# Patient Record
Sex: FEMALE | Race: WHITE | NOT HISPANIC OR LATINO
[De-identification: names, ages, dates, MRNs, and addresses within clinical notes are randomized per-mention and may not be internally consistent; named-entity substitution may affect disease eponyms.]

---

## 2018-08-02 ENCOUNTER — APPOINTMENT (OUTPATIENT)
Dept: OBGYN | Facility: CLINIC | Age: 43
End: 2018-08-02

## 2018-08-14 ENCOUNTER — OUTPATIENT (OUTPATIENT)
Dept: OUTPATIENT SERVICES | Facility: HOSPITAL | Age: 43
LOS: 1 days | End: 2018-08-14
Payer: COMMERCIAL

## 2018-08-14 ENCOUNTER — APPOINTMENT (OUTPATIENT)
Dept: MAMMOGRAPHY | Facility: HOSPITAL | Age: 43
End: 2018-08-14
Payer: COMMERCIAL

## 2018-08-14 DIAGNOSIS — Z00.8 ENCOUNTER FOR OTHER GENERAL EXAMINATION: ICD-10-CM

## 2018-08-14 PROCEDURE — 77067 SCR MAMMO BI INCL CAD: CPT | Mod: 26

## 2018-08-14 PROCEDURE — 77063 BREAST TOMOSYNTHESIS BI: CPT

## 2018-08-14 PROCEDURE — 77067 SCR MAMMO BI INCL CAD: CPT

## 2018-08-14 PROCEDURE — 77063 BREAST TOMOSYNTHESIS BI: CPT | Mod: 26

## 2019-02-15 ENCOUNTER — TRANSCRIPTION ENCOUNTER (OUTPATIENT)
Age: 44
End: 2019-02-15

## 2019-04-23 ENCOUNTER — APPOINTMENT (OUTPATIENT)
Dept: OBGYN | Facility: CLINIC | Age: 44
End: 2019-04-23
Payer: COMMERCIAL

## 2019-04-23 VITALS
WEIGHT: 110 LBS | DIASTOLIC BLOOD PRESSURE: 70 MMHG | HEIGHT: 66 IN | HEART RATE: 85 BPM | BODY MASS INDEX: 17.68 KG/M2 | SYSTOLIC BLOOD PRESSURE: 100 MMHG | OXYGEN SATURATION: 98 %

## 2019-04-23 DIAGNOSIS — Z87.891 PERSONAL HISTORY OF NICOTINE DEPENDENCE: ICD-10-CM

## 2019-04-23 DIAGNOSIS — R92.2 INCONCLUSIVE MAMMOGRAM: ICD-10-CM

## 2019-04-23 DIAGNOSIS — Z83.3 FAMILY HISTORY OF DIABETES MELLITUS: ICD-10-CM

## 2019-04-23 DIAGNOSIS — Z80.51 FAMILY HISTORY OF MALIGNANT NEOPLASM OF KIDNEY: ICD-10-CM

## 2019-04-23 DIAGNOSIS — Z12.31 ENCOUNTER FOR SCREENING MAMMOGRAM FOR MALIGNANT NEOPLASM OF BREAST: ICD-10-CM

## 2019-04-23 DIAGNOSIS — Z12.11 ENCOUNTER FOR SCREENING FOR MALIGNANT NEOPLASM OF COLON: ICD-10-CM

## 2019-04-23 PROCEDURE — 99386 PREV VISIT NEW AGE 40-64: CPT

## 2019-04-23 NOTE — PHYSICAL EXAM
[Awake] : awake [Alert] : alert [Acute Distress] : no acute distress [Mass] : no breast mass [Nipple Discharge] : no nipple discharge [Axillary LAD] : no axillary lymphadenopathy [Tender] : non tender [Soft] : soft [Oriented x3] : oriented to person, place, and time [No Bleeding] : there was no active vaginal bleeding [Normal] : uterus [Uterine Adnexae] : were not tender and not enlarged

## 2019-04-23 NOTE — HISTORY OF PRESENT ILLNESS
[Good] : being in good health [Reproductive Age] : is of reproductive age [Contraception] : uses contraception [Sexually Active] : is sexually active [Vasectomy (partner)] : has a partner with a vasectomy [HPV Vaccine NA Due to Age] : HPV vaccine not available to patient due to age

## 2019-04-24 LAB — HPV HIGH+LOW RISK DNA PNL CVX: NOT DETECTED

## 2019-04-26 LAB — CYTOLOGY CVX/VAG DOC THIN PREP: NORMAL

## 2019-07-10 ENCOUNTER — APPOINTMENT (OUTPATIENT)
Dept: GASTROENTEROLOGY | Facility: CLINIC | Age: 44
End: 2019-07-10

## 2019-08-30 ENCOUNTER — APPOINTMENT (OUTPATIENT)
Dept: OBGYN | Facility: CLINIC | Age: 44
End: 2019-08-30
Payer: COMMERCIAL

## 2019-08-30 VITALS
DIASTOLIC BLOOD PRESSURE: 70 MMHG | HEIGHT: 66 IN | SYSTOLIC BLOOD PRESSURE: 110 MMHG | WEIGHT: 103 LBS | BODY MASS INDEX: 16.55 KG/M2

## 2019-08-30 DIAGNOSIS — N94.6 DYSMENORRHEA, UNSPECIFIED: ICD-10-CM

## 2019-08-30 LAB
HCG UR QL: NEGATIVE
QUALITY CONTROL: YES

## 2019-08-30 PROCEDURE — 99214 OFFICE O/P EST MOD 30 MIN: CPT

## 2019-08-30 NOTE — COUNSELING
[Breast Self Exam] : breast self exam [Nutrition] : nutrition [Exercise] : exercise [Vitamins/Supplements] : vitamins/supplements [STD (testing, results, tx)] : STD (testing, results, tx) [Contraception] : contraception [Emergency Contraception] : emergency contraception [Safe Sexual Practices] : safe sexual practices [Medication Management] : medication management [Pain Management] : pain management

## 2019-08-30 NOTE — PHYSICAL EXAM
[Normal] : uterus [Uterine Adnexae] : were not tender and not enlarged [Soft, Nontender] : the abdomen was soft and nontender [No Mass] : no masses were palpated [None] : no CVA tenderness [Scant] : there was scant vaginal bleeding

## 2019-08-30 NOTE — CHIEF COMPLAINT
[Follow Up] : follow up GYN visit [FreeTextEntry1] : menstrual disorder--severe dysmen past 2 mos and ovulation pain with diarrhea and nauseated.Fatigued as well.Info HTA/Mirena provided. Menses has been heavy

## 2019-09-19 ENCOUNTER — APPOINTMENT (OUTPATIENT)
Dept: ULTRASOUND IMAGING | Facility: HOSPITAL | Age: 44
End: 2019-09-19
Payer: COMMERCIAL

## 2019-09-19 ENCOUNTER — OUTPATIENT (OUTPATIENT)
Dept: OUTPATIENT SERVICES | Facility: HOSPITAL | Age: 44
LOS: 1 days | End: 2019-09-19
Payer: COMMERCIAL

## 2019-09-19 ENCOUNTER — APPOINTMENT (OUTPATIENT)
Dept: MAMMOGRAPHY | Facility: HOSPITAL | Age: 44
End: 2019-09-19
Payer: COMMERCIAL

## 2019-09-19 DIAGNOSIS — Z00.8 ENCOUNTER FOR OTHER GENERAL EXAMINATION: ICD-10-CM

## 2019-09-19 PROCEDURE — 76641 ULTRASOUND BREAST COMPLETE: CPT | Mod: 26,50

## 2019-09-19 PROCEDURE — 76830 TRANSVAGINAL US NON-OB: CPT | Mod: 26

## 2019-09-19 PROCEDURE — 77063 BREAST TOMOSYNTHESIS BI: CPT

## 2019-09-19 PROCEDURE — 77063 BREAST TOMOSYNTHESIS BI: CPT | Mod: 26,59

## 2019-09-19 PROCEDURE — 77065 DX MAMMO INCL CAD UNI: CPT

## 2019-09-19 PROCEDURE — 76856 US EXAM PELVIC COMPLETE: CPT | Mod: 26

## 2019-09-19 PROCEDURE — 77067 SCR MAMMO BI INCL CAD: CPT | Mod: 26,59

## 2019-09-19 PROCEDURE — 76856 US EXAM PELVIC COMPLETE: CPT

## 2019-09-19 PROCEDURE — 77065 DX MAMMO INCL CAD UNI: CPT | Mod: 26,GG,RT

## 2019-09-19 PROCEDURE — 77067 SCR MAMMO BI INCL CAD: CPT

## 2019-09-19 PROCEDURE — 76830 TRANSVAGINAL US NON-OB: CPT

## 2019-09-19 PROCEDURE — 76641 ULTRASOUND BREAST COMPLETE: CPT

## 2019-09-25 ENCOUNTER — APPOINTMENT (OUTPATIENT)
Dept: OBGYN | Facility: CLINIC | Age: 44
End: 2019-09-25
Payer: COMMERCIAL

## 2019-09-25 VITALS
WEIGHT: 106 LBS | DIASTOLIC BLOOD PRESSURE: 68 MMHG | OXYGEN SATURATION: 98 % | HEART RATE: 82 BPM | BODY MASS INDEX: 17.04 KG/M2 | SYSTOLIC BLOOD PRESSURE: 104 MMHG | HEIGHT: 66 IN

## 2019-09-25 DIAGNOSIS — R93.89 ABNORMAL FINDINGS ON DIAGNOSTIC IMAGING OF OTHER SPECIFIED BODY STRUCTURES: ICD-10-CM

## 2019-09-25 DIAGNOSIS — R92.8 OTHER ABNORMAL AND INCONCLUSIVE FINDINGS ON DIAGNOSTIC IMAGING OF BREAST: ICD-10-CM

## 2019-09-25 DIAGNOSIS — N92.6 IRREGULAR MENSTRUATION, UNSPECIFIED: ICD-10-CM

## 2019-09-25 LAB
HCG UR QL: NEGATIVE
QUALITY CONTROL: YES

## 2019-09-25 PROCEDURE — 58100 BIOPSY OF UTERUS LINING: CPT

## 2019-09-25 PROCEDURE — 99214 OFFICE O/P EST MOD 30 MIN: CPT | Mod: 25

## 2019-09-25 NOTE — CHIEF COMPLAINT
[Follow Up] : follow up GYN visit [FreeTextEntry1] : pt with menstrual disorder. Rev TVS EL 16 mm.\par Husb vasectomy. Menses heavy --disc options as well with pt TXA,HTA,Mirena\par LMP 9/24  8/27\par Rev breast imaging with pt--rev rec .Pt interested in breast specialist consult

## 2019-09-25 NOTE — PROCEDURE
[Endometrial Biopsy] : Endometrial biopsy [Abnormal US] : abnormal ultrasound findings [Risks] : risks [Patient] : patient [Bleeding] : bleeding [Uterine Perforation] : uterine perforation [Pain] : pain [CONSENT OBTAINED] : written consent was obtained prior to the procedure. [Pipelle] : a Pipelle endometrial suction curette [Moderate] : a moderate [Sent to Histology] : the specimen was place in buffered formalin and sent for pathlogy [Tolerated Well] : the patient tolerated the procedure well [No Complications] : there were no complications

## 2019-09-25 NOTE — COUNSELING
[Breast Self Exam] : breast self exam [Exercise] : exercise [Nutrition] : nutrition [Vitamins/Supplements] : vitamins/supplements [Medication Management] : medication management [Pre/Post Op Instructions] : pre/post op instructions

## 2019-09-30 LAB — CORE LAB BIOPSY: NORMAL

## 2019-10-01 ENCOUNTER — OTHER (OUTPATIENT)
Age: 44
End: 2019-10-01

## 2020-03-20 ENCOUNTER — RESULT REVIEW (OUTPATIENT)
Age: 45
End: 2020-03-20

## 2020-03-20 ENCOUNTER — APPOINTMENT (OUTPATIENT)
Dept: ULTRASOUND IMAGING | Facility: HOSPITAL | Age: 45
End: 2020-03-20
Payer: COMMERCIAL

## 2020-03-20 ENCOUNTER — OUTPATIENT (OUTPATIENT)
Dept: OUTPATIENT SERVICES | Facility: HOSPITAL | Age: 45
LOS: 1 days | End: 2020-03-20
Payer: COMMERCIAL

## 2020-03-20 DIAGNOSIS — Z12.31 ENCOUNTER FOR SCREENING MAMMOGRAM FOR MALIGNANT NEOPLASM OF BREAST: ICD-10-CM

## 2020-03-20 PROCEDURE — 76641 ULTRASOUND BREAST COMPLETE: CPT

## 2020-03-20 PROCEDURE — 76641 ULTRASOUND BREAST COMPLETE: CPT | Mod: 26,50

## 2020-10-03 ENCOUNTER — TRANSCRIPTION ENCOUNTER (OUTPATIENT)
Age: 45
End: 2020-10-03

## 2020-11-24 ENCOUNTER — APPOINTMENT (OUTPATIENT)
Dept: ULTRASOUND IMAGING | Facility: HOSPITAL | Age: 45
End: 2020-11-24

## 2020-11-24 ENCOUNTER — RESULT REVIEW (OUTPATIENT)
Age: 45
End: 2020-11-24

## 2020-11-24 ENCOUNTER — APPOINTMENT (OUTPATIENT)
Dept: MAMMOGRAPHY | Facility: HOSPITAL | Age: 45
End: 2020-11-24
Payer: COMMERCIAL

## 2020-11-24 ENCOUNTER — OUTPATIENT (OUTPATIENT)
Dept: OUTPATIENT SERVICES | Facility: HOSPITAL | Age: 45
LOS: 1 days | End: 2020-11-24
Payer: COMMERCIAL

## 2020-11-24 DIAGNOSIS — Z12.39 ENCOUNTER FOR OTHER SCREENING FOR MALIGNANT NEOPLASM OF BREAST: ICD-10-CM

## 2020-11-24 PROCEDURE — 76641 ULTRASOUND BREAST COMPLETE: CPT | Mod: 26,50

## 2020-11-24 PROCEDURE — 77063 BREAST TOMOSYNTHESIS BI: CPT

## 2020-11-24 PROCEDURE — 76641 ULTRASOUND BREAST COMPLETE: CPT

## 2020-11-24 PROCEDURE — 77063 BREAST TOMOSYNTHESIS BI: CPT | Mod: 26

## 2020-11-24 PROCEDURE — 77067 SCR MAMMO BI INCL CAD: CPT

## 2020-11-24 PROCEDURE — 77067 SCR MAMMO BI INCL CAD: CPT | Mod: 26

## 2021-02-01 ENCOUNTER — APPOINTMENT (OUTPATIENT)
Dept: OBGYN | Facility: CLINIC | Age: 46
End: 2021-02-01
Payer: COMMERCIAL

## 2021-02-05 ENCOUNTER — APPOINTMENT (OUTPATIENT)
Dept: OBGYN | Facility: CLINIC | Age: 46
End: 2021-02-05
Payer: COMMERCIAL

## 2021-02-05 VITALS
OXYGEN SATURATION: 98 % | BODY MASS INDEX: 17.68 KG/M2 | DIASTOLIC BLOOD PRESSURE: 70 MMHG | WEIGHT: 110 LBS | TEMPERATURE: 97.3 F | SYSTOLIC BLOOD PRESSURE: 120 MMHG | HEIGHT: 66 IN | HEART RATE: 75 BPM

## 2021-02-05 DIAGNOSIS — N92.0 EXCESSIVE AND FREQUENT MENSTRUATION WITH REGULAR CYCLE: ICD-10-CM

## 2021-02-05 DIAGNOSIS — Z01.419 ENCOUNTER FOR GYNECOLOGICAL EXAMINATION (GENERAL) (ROUTINE) W/OUT ABNORMAL FINDINGS: ICD-10-CM

## 2021-02-05 LAB
HCG UR QL: NEGATIVE
QUALITY CONTROL: YES

## 2021-02-05 PROCEDURE — 99072 ADDL SUPL MATRL&STAF TM PHE: CPT

## 2021-02-05 PROCEDURE — 99396 PREV VISIT EST AGE 40-64: CPT

## 2021-02-05 NOTE — HISTORY OF PRESENT ILLNESS
[Currently Active] : currently active [Mammogramdate] : 2020 [BreastSonogramDate] : 2020 [PapSmeardate] : 2019 [FreeTextEntry3] : vasectomy

## 2021-02-06 ENCOUNTER — TRANSCRIPTION ENCOUNTER (OUTPATIENT)
Age: 46
End: 2021-02-06

## 2021-02-06 LAB — HPV HIGH+LOW RISK DNA PNL CVX: NOT DETECTED

## 2021-02-10 LAB — CYTOLOGY CVX/VAG DOC THIN PREP: NORMAL

## 2021-11-14 ENCOUNTER — TRANSCRIPTION ENCOUNTER (OUTPATIENT)
Age: 46
End: 2021-11-14

## 2022-03-31 ENCOUNTER — TRANSCRIPTION ENCOUNTER (OUTPATIENT)
Age: 47
End: 2022-03-31

## 2022-04-06 ENCOUNTER — APPOINTMENT (OUTPATIENT)
Dept: FAMILY MEDICINE | Facility: CLINIC | Age: 47
End: 2022-04-06
Payer: COMMERCIAL

## 2022-04-06 VITALS
BODY MASS INDEX: 18.27 KG/M2 | OXYGEN SATURATION: 98 % | DIASTOLIC BLOOD PRESSURE: 80 MMHG | HEIGHT: 65.5 IN | HEART RATE: 88 BPM | WEIGHT: 111 LBS | SYSTOLIC BLOOD PRESSURE: 138 MMHG | TEMPERATURE: 97.6 F

## 2022-04-06 DIAGNOSIS — F41.9 ANXIETY DISORDER, UNSPECIFIED: ICD-10-CM

## 2022-04-06 DIAGNOSIS — J01.90 ACUTE SINUSITIS, UNSPECIFIED: ICD-10-CM

## 2022-04-06 PROCEDURE — 99213 OFFICE O/P EST LOW 20 MIN: CPT

## 2022-04-06 RX ORDER — METHYLPREDNISOLONE 4 MG/1
4 TABLET ORAL
Qty: 1 | Refills: 0 | Status: ACTIVE | COMMUNITY
Start: 2022-04-06 | End: 1900-01-01

## 2022-04-06 RX ORDER — DOXYCYCLINE HYCLATE 100 MG/1
100 CAPSULE ORAL
Qty: 20 | Refills: 0 | Status: ACTIVE | COMMUNITY
Start: 2022-04-06 | End: 1900-01-01

## 2022-04-06 RX ORDER — TRANEXAMIC ACID 650 MG/1
650 TABLET ORAL
Qty: 30 | Refills: 4 | Status: DISCONTINUED | COMMUNITY
Start: 2021-02-05 | End: 2022-04-06

## 2022-04-06 NOTE — HEALTH RISK ASSESSMENT
[Never] : Never [Yes] : Yes [4 or more  times a week (4 pts)] : 4 or more  times a week (4 points) [3 or 4 (1 pt)] : 3 or 4  (1 point) [Never (0 pts)] : Never (0 points) [No] : In the past 12 months have you used drugs other than those required for medical reasons? No [No falls in past year] : Patient reported no falls in the past year [0] : 2) Feeling down, depressed, or hopeless: Not at all (0) [PHQ-2 Negative - No further assessment needed] : PHQ-2 Negative - No further assessment needed [Audit-CScore] : 5 [de-identified] : active, exercises when she has the time. [de-identified] : well balanced. [DIR0Qcnwo] : 0

## 2022-04-06 NOTE — ASSESSMENT
[FreeTextEntry1] : Patient is a 48yo female presenting to the office for evaluation of acute sinusitis and anxiety.\par \par Acute Sinusitis\par - Discontinue Augmentin.  Allergy to Augmentin recorded in patient's chart.\par - Start Doxycycline 100mg BID x10 days with food.\par - Start Medrol dose pack.\par - Supportive care including increased fluids, Ibuprofen/Acetaminophen as needed for pain/aches/fevers, OTC cough suppressants/nasal decongestants as needed.\par \par Anxiety\par - RX for Xanax 0.25mg 1/2 tablet to 1 tablet daily as needed.  Pt understanding of addictive qualities of Xanax and R/B/A/SE.\par - Declines SSRIs now, will re-evaluate if pt requires increased doses of Xanax/Xanax more frequently.\par - List of psychologist/therapists/psychiatrists provided.\par \par Due for CPE, pt to schedule in the next few months.\par \par Call the office or go to the ED immediately if you develop new, worsening or concerning symptoms including high fever, severe headache/worst headache of your life, confusion, dizziness/lightheadedness, loss of consciousness, severe chest pain, difficulty breathing, shortness of breath, severe abdominal pain, excessive vomiting/diarrhea, inability to feel/move the extremities, or any other concerning symptoms.\par

## 2022-04-06 NOTE — REVIEW OF SYSTEMS
[Nasal Discharge] : nasal discharge [Sore Throat] : sore throat [Postnasal Drip] : postnasal drip [Cough] : cough [Skin Rash] : skin rash [Anxiety] : anxiety [Negative] : Neurological [Shortness Of Breath] : no shortness of breath [Wheezing] : no wheezing [Suicidal] : not suicidal [Insomnia] : no insomnia [Depression] : no depression

## 2022-04-06 NOTE — HISTORY OF PRESENT ILLNESS
[FreeTextEntry8] : Pt is a 46yo female presenting to the office complaining of sinusitis and anxiety.\par Pt has sinus infection, diagnosed via virtual visit Urgent Care.  Sinus symptoms started a few weeks ago with mild URI symptoms, persisted/worsened which prompted UC visit.\ladi Prescribed Augmentin BID, started 3/31/22, noticed rash on the abdomen so stopped for a few days, took again today with return of rash on the stomach.\ladi Was also prescribed Flonase nasal spray but pt states makes her dizzy, would like RX for Medrol which she has taken in the past for sinus infections with improvement.\par \ladi Pt has been feeling increased anxiety symptoms for the past several months.\par Pt has never taken Xanax in the past.\par Has symptoms of panic attacks, states occurs a few times per week.\par Has tried SSRI in the past but did not like the way it feels, not interested in trying SSRI/daily medication at this time.\par stressors include work, recent deaths in the family.\ladi Admits to drinking every evening, 2-3 beers at a time, self medicating.

## 2022-06-30 ENCOUNTER — NON-APPOINTMENT (OUTPATIENT)
Age: 47
End: 2022-06-30

## 2022-06-30 RX ORDER — ALPRAZOLAM 0.25 MG/1
0.25 TABLET ORAL
Qty: 30 | Refills: 0 | Status: ACTIVE | COMMUNITY
Start: 2022-04-06 | End: 1900-01-01

## 2022-07-01 ENCOUNTER — APPOINTMENT (OUTPATIENT)
Dept: FAMILY MEDICINE | Facility: CLINIC | Age: 47
End: 2022-07-01

## 2022-08-15 ENCOUNTER — NON-APPOINTMENT (OUTPATIENT)
Age: 47
End: 2022-08-15

## 2022-08-15 ENCOUNTER — APPOINTMENT (OUTPATIENT)
Dept: FAMILY MEDICINE | Facility: CLINIC | Age: 47
End: 2022-08-15

## 2022-08-15 ENCOUNTER — RESULT CHARGE (OUTPATIENT)
Age: 47
End: 2022-08-15

## 2022-08-15 VITALS
DIASTOLIC BLOOD PRESSURE: 88 MMHG | WEIGHT: 110 LBS | BODY MASS INDEX: 18.03 KG/M2 | HEART RATE: 97 BPM | SYSTOLIC BLOOD PRESSURE: 134 MMHG | TEMPERATURE: 98.4 F | OXYGEN SATURATION: 96 %

## 2022-08-15 DIAGNOSIS — R09.81 NASAL CONGESTION: ICD-10-CM

## 2022-08-15 DIAGNOSIS — Z00.00 ENCOUNTER FOR GENERAL ADULT MEDICAL EXAMINATION W/OUT ABNORMAL FINDINGS: ICD-10-CM

## 2022-08-15 LAB
BILIRUB UR QL STRIP: NEGATIVE
GLUCOSE UR-MCNC: NEGATIVE
HCG UR QL: 0.2 EU/DL
HGB UR QL STRIP.AUTO: NORMAL
KETONES UR-MCNC: 40
LEUKOCYTE ESTERASE UR QL STRIP: NORMAL
NITRITE UR QL STRIP: NEGATIVE
PH UR STRIP: 7
PROT UR STRIP-MCNC: NORMAL
SP GR UR STRIP: 1.01

## 2022-08-15 PROCEDURE — 93000 ELECTROCARDIOGRAM COMPLETE: CPT

## 2022-08-15 PROCEDURE — 99386 PREV VISIT NEW AGE 40-64: CPT | Mod: 25

## 2022-08-15 PROCEDURE — 99173 VISUAL ACUITY SCREEN: CPT

## 2022-08-15 PROCEDURE — 81003 URINALYSIS AUTO W/O SCOPE: CPT | Mod: QW

## 2022-08-15 PROCEDURE — 36415 COLL VENOUS BLD VENIPUNCTURE: CPT

## 2022-08-17 LAB
ALBUMIN SERPL ELPH-MCNC: 5 G/DL
ALP BLD-CCNC: 64 U/L
ALT SERPL-CCNC: 104 U/L
ANION GAP SERPL CALC-SCNC: 17 MMOL/L
APPEARANCE: ABNORMAL
AST SERPL-CCNC: 199 U/L
BACTERIA: NEGATIVE
BASOPHILS # BLD AUTO: 0.09 K/UL
BASOPHILS NFR BLD AUTO: 1.9 %
BILIRUB SERPL-MCNC: 0.4 MG/DL
BILIRUBIN URINE: NEGATIVE
BLOOD URINE: NEGATIVE
BUN SERPL-MCNC: 5 MG/DL
CALCIUM SERPL-MCNC: 9.3 MG/DL
CHLORIDE SERPL-SCNC: 97 MMOL/L
CHOLEST SERPL-MCNC: 320 MG/DL
CO2 SERPL-SCNC: 24 MMOL/L
COLOR: YELLOW
CREAT SERPL-MCNC: 0.57 MG/DL
EGFR: 112 ML/MIN/1.73M2
EOSINOPHIL # BLD AUTO: 0.08 K/UL
EOSINOPHIL NFR BLD AUTO: 1.7 %
GLUCOSE QUALITATIVE U: NEGATIVE
GLUCOSE SERPL-MCNC: 80 MG/DL
HCT VFR BLD CALC: 42.5 %
HDLC SERPL-MCNC: 121 MG/DL
HGB BLD-MCNC: 13.3 G/DL
HYALINE CASTS: 3 /LPF
IMM GRANULOCYTES NFR BLD AUTO: 0.4 %
KETONES URINE: ABNORMAL
LDLC SERPL CALC-MCNC: 181 MG/DL
LEUKOCYTE ESTERASE URINE: NEGATIVE
LYMPHOCYTES # BLD AUTO: 1.56 K/UL
LYMPHOCYTES NFR BLD AUTO: 33.3 %
MAN DIFF?: NORMAL
MCHC RBC-ENTMCNC: 31.3 GM/DL
MCHC RBC-ENTMCNC: 31.6 PG
MCV RBC AUTO: 101 FL
MICROSCOPIC-UA: NORMAL
MONOCYTES # BLD AUTO: 0.52 K/UL
MONOCYTES NFR BLD AUTO: 11.1 %
NEUTROPHILS # BLD AUTO: 2.42 K/UL
NEUTROPHILS NFR BLD AUTO: 51.6 %
NITRITE URINE: NEGATIVE
NONHDLC SERPL-MCNC: 199 MG/DL
PH URINE: 7
PLATELET # BLD AUTO: 474 K/UL
POTASSIUM SERPL-SCNC: 4.2 MMOL/L
PROT SERPL-MCNC: 7.4 G/DL
PROTEIN URINE: NORMAL
RBC # BLD: 4.21 M/UL
RBC # FLD: 15.4 %
RED BLOOD CELLS URINE: 3 /HPF
SODIUM SERPL-SCNC: 137 MMOL/L
SPECIFIC GRAVITY URINE: 1.01
SQUAMOUS EPITHELIAL CELLS: 6 /HPF
T3FREE SERPL-MCNC: 2.92 PG/ML
T4 FREE SERPL-MCNC: 1.2 NG/DL
TRIGL SERPL-MCNC: 90 MG/DL
TSH SERPL-ACNC: 1.22 UIU/ML
UROBILINOGEN URINE: NORMAL
WBC # FLD AUTO: 4.69 K/UL
WHITE BLOOD CELLS URINE: 1 /HPF

## 2022-08-17 NOTE — PLAN
[FreeTextEntry1] : Blood work done in office today. Will follow up on results with patient.\par Extensive counseling provided on lifestyle modifications (healthy diet, daily exercise, routine pap smears, colonoscopy). \par Return for CPE in 1 year.\par \par

## 2022-08-17 NOTE — HEALTH RISK ASSESSMENT
[Never] : Never [Yes] : Yes [2 - 3 times a week (3 pts)] : 2 - 3  times a week (3 points) [No] : In the past 12 months have you used drugs other than those required for medical reasons? No [No falls in past year] : Patient reported no falls in the past year [0] : 2) Feeling down, depressed, or hopeless: Not at all (0) [PHQ-2 Negative - No further assessment needed] : PHQ-2 Negative - No further assessment needed [HIV test declined] : HIV test declined [Hepatitis C test declined] : Hepatitis C test declined [With Family] : lives with family [Employed] : employed [Graduate School] : graduate school [] :  [Sexually Active] : sexually active [Feels Safe at Home] : Feels safe at home [Reports normal functional visual acuity (ie: able to read med bottle)] : Reports normal functional visual acuity [Smoke Detector] : smoke detector [Carbon Monoxide Detector] : carbon monoxide detector [Seat Belt] :  uses seat belt [Sunscreen] : uses sunscreen [Audit-CScore] : 3 [PKB4Ugbye] : 0 [Change in mental status noted] : No change in mental status noted [Language] : denies difficulty with language [Behavior] : denies difficulty with behavior [Learning/Retaining New Information] : denies difficulty learning/retaining new information [Handling Complex Tasks] : denies difficulty handling complex tasks [Reasoning] : denies difficulty with reasoning [Spatial Ability and Orientation] : denies difficulty with spatial ability and orientation [Reports changes in hearing] : Reports no changes in hearing [Reports changes in vision] : Reports no changes in vision [Reports changes in dental health] : Reports no changes in dental health [Guns at Home] : no guns at home [Travel to Developing Areas] : does not  travel to developing areas [TB Exposure] : is not being exposed to tuberculosis [Caregiver Concerns] : does not have caregiver concerns

## 2022-08-17 NOTE — PHYSICAL EXAM
[20/___] : left eye 20/[unfilled] [No Acute Distress] : no acute distress [Well Nourished] : well nourished [Well Developed] : well developed [Well-Appearing] : well-appearing [Normal Sclera/Conjunctiva] : normal sclera/conjunctiva [PERRL] : pupils equal round and reactive to light [EOMI] : extraocular movements intact [Normal Outer Ear/Nose] : the outer ears and nose were normal in appearance [Normal Oropharynx] : the oropharynx was normal [No JVD] : no jugular venous distention [No Lymphadenopathy] : no lymphadenopathy [Supple] : supple [Thyroid Normal, No Nodules] : the thyroid was normal and there were no nodules present [No Respiratory Distress] : no respiratory distress  [No Accessory Muscle Use] : no accessory muscle use [Clear to Auscultation] : lungs were clear to auscultation bilaterally [Normal Rate] : normal rate  [Regular Rhythm] : with a regular rhythm [Normal S1, S2] : normal S1 and S2 [No Murmur] : no murmur heard [No Carotid Bruits] : no carotid bruits [No Abdominal Bruit] : a ~M bruit was not heard ~T in the abdomen [No Varicosities] : no varicosities [Pedal Pulses Present] : the pedal pulses are present [No Edema] : there was no peripheral edema [No Palpable Aorta] : no palpable aorta [No Extremity Clubbing/Cyanosis] : no extremity clubbing/cyanosis [Soft] : abdomen soft [Non Tender] : non-tender [Non-distended] : non-distended [No Masses] : no abdominal mass palpated [No HSM] : no HSM [Normal Bowel Sounds] : normal bowel sounds [Normal Posterior Cervical Nodes] : no posterior cervical lymphadenopathy [Normal Anterior Cervical Nodes] : no anterior cervical lymphadenopathy [No CVA Tenderness] : no CVA  tenderness [No Spinal Tenderness] : no spinal tenderness [No Joint Swelling] : no joint swelling [Grossly Normal Strength/Tone] : grossly normal strength/tone [No Rash] : no rash [Coordination Grossly Intact] : coordination grossly intact [No Focal Deficits] : no focal deficits [Normal Gait] : normal gait [Deep Tendon Reflexes (DTR)] : deep tendon reflexes were 2+ and symmetric [Normal Affect] : the affect was normal [Normal Insight/Judgement] : insight and judgment were intact [FreeTextEntry1] : Right\par \par 0.5 - 45\par 1 - 35\par 2 - 20\par 4 - 20\par \par Left\par \par 0.5 - 35\par 1 - 35\par 2 - 25\par 4 - 25

## 2022-08-22 ENCOUNTER — TRANSCRIPTION ENCOUNTER (OUTPATIENT)
Age: 47
End: 2022-08-22

## 2022-08-22 ENCOUNTER — APPOINTMENT (OUTPATIENT)
Dept: FAMILY MEDICINE | Facility: CLINIC | Age: 47
End: 2022-08-22

## 2022-10-06 ENCOUNTER — RX RENEWAL (OUTPATIENT)
Age: 47
End: 2022-10-06

## 2022-10-07 RX ORDER — FLUTICASONE PROPIONATE 50 UG/1
50 SPRAY, METERED NASAL TWICE DAILY
Qty: 16 | Refills: 0 | Status: ACTIVE | COMMUNITY
Start: 2022-08-15 | End: 1900-01-01

## 2023-01-03 ENCOUNTER — NON-APPOINTMENT (OUTPATIENT)
Age: 48
End: 2023-01-03

## 2023-01-04 ENCOUNTER — NON-APPOINTMENT (OUTPATIENT)
Age: 48
End: 2023-01-04

## 2023-01-05 ENCOUNTER — NON-APPOINTMENT (OUTPATIENT)
Age: 48
End: 2023-01-05

## 2023-04-03 ENCOUNTER — EMERGENCY (EMERGENCY)
Facility: HOSPITAL | Age: 48
LOS: 1 days | Discharge: HOME | End: 2023-04-03
Attending: INTERNAL MEDICINE | Admitting: INTERNAL MEDICINE
Payer: COMMERCIAL

## 2023-04-03 VITALS
OXYGEN SATURATION: 96 % | DIASTOLIC BLOOD PRESSURE: 97 MMHG | RESPIRATION RATE: 16 BRPM | HEART RATE: 113 BPM | WEIGHT: 110.01 LBS | SYSTOLIC BLOOD PRESSURE: 130 MMHG | HEIGHT: 65 IN | TEMPERATURE: 98 F

## 2023-04-03 VITALS
DIASTOLIC BLOOD PRESSURE: 76 MMHG | SYSTOLIC BLOOD PRESSURE: 124 MMHG | RESPIRATION RATE: 16 BRPM | OXYGEN SATURATION: 100 % | HEART RATE: 90 BPM

## 2023-04-03 LAB
ALBUMIN SERPL ELPH-MCNC: 3.5 G/DL — SIGNIFICANT CHANGE UP (ref 3.3–5)
ALP SERPL-CCNC: 69 U/L — SIGNIFICANT CHANGE UP (ref 40–120)
ALT FLD-CCNC: 60 U/L — HIGH (ref 10–45)
ANION GAP SERPL CALC-SCNC: 9 MMOL/L — SIGNIFICANT CHANGE UP (ref 5–17)
APPEARANCE UR: CLEAR — SIGNIFICANT CHANGE UP
AST SERPL-CCNC: 115 U/L — HIGH (ref 10–40)
BASOPHILS # BLD AUTO: 0.1 K/UL — SIGNIFICANT CHANGE UP (ref 0–0.2)
BASOPHILS NFR BLD AUTO: 2.1 % — HIGH (ref 0–2)
BILIRUB SERPL-MCNC: 0.2 MG/DL — SIGNIFICANT CHANGE UP (ref 0.2–1.2)
BILIRUB UR-MCNC: NEGATIVE — SIGNIFICANT CHANGE UP
BUN SERPL-MCNC: 5 MG/DL — LOW (ref 7–23)
CALCIUM SERPL-MCNC: 8.2 MG/DL — LOW (ref 8.4–10.5)
CHLORIDE SERPL-SCNC: 101 MMOL/L — SIGNIFICANT CHANGE UP (ref 96–108)
CO2 SERPL-SCNC: 32 MMOL/L — HIGH (ref 22–31)
COLOR SPEC: YELLOW — SIGNIFICANT CHANGE UP
CREAT SERPL-MCNC: 0.5 MG/DL — SIGNIFICANT CHANGE UP (ref 0.5–1.3)
DIFF PNL FLD: NEGATIVE — SIGNIFICANT CHANGE UP
EGFR: 115 ML/MIN/1.73M2 — SIGNIFICANT CHANGE UP
EOSINOPHIL # BLD AUTO: 0.07 K/UL — SIGNIFICANT CHANGE UP (ref 0–0.5)
EOSINOPHIL NFR BLD AUTO: 1.5 % — SIGNIFICANT CHANGE UP (ref 0–6)
GLUCOSE SERPL-MCNC: 97 MG/DL — SIGNIFICANT CHANGE UP (ref 70–99)
GLUCOSE UR QL: NEGATIVE — SIGNIFICANT CHANGE UP
HCT VFR BLD CALC: 38.9 % — SIGNIFICANT CHANGE UP (ref 34.5–45)
HGB BLD-MCNC: 13.2 G/DL — SIGNIFICANT CHANGE UP (ref 11.5–15.5)
IMM GRANULOCYTES NFR BLD AUTO: 0.4 % — SIGNIFICANT CHANGE UP (ref 0–0.9)
KETONES UR-MCNC: NEGATIVE — SIGNIFICANT CHANGE UP
LEUKOCYTE ESTERASE UR-ACNC: NEGATIVE — SIGNIFICANT CHANGE UP
LIDOCAIN IGE QN: 327 U/L — SIGNIFICANT CHANGE UP (ref 73–393)
LYMPHOCYTES # BLD AUTO: 1.27 K/UL — SIGNIFICANT CHANGE UP (ref 1–3.3)
LYMPHOCYTES # BLD AUTO: 27.1 % — SIGNIFICANT CHANGE UP (ref 13–44)
MCHC RBC-ENTMCNC: 33.8 PG — SIGNIFICANT CHANGE UP (ref 27–34)
MCHC RBC-ENTMCNC: 33.9 GM/DL — SIGNIFICANT CHANGE UP (ref 32–36)
MCV RBC AUTO: 99.7 FL — SIGNIFICANT CHANGE UP (ref 80–100)
MONOCYTES # BLD AUTO: 0.42 K/UL — SIGNIFICANT CHANGE UP (ref 0–0.9)
MONOCYTES NFR BLD AUTO: 9 % — SIGNIFICANT CHANGE UP (ref 2–14)
NEUTROPHILS # BLD AUTO: 2.8 K/UL — SIGNIFICANT CHANGE UP (ref 1.8–7.4)
NEUTROPHILS NFR BLD AUTO: 59.9 % — SIGNIFICANT CHANGE UP (ref 43–77)
NITRITE UR-MCNC: NEGATIVE — SIGNIFICANT CHANGE UP
NRBC # BLD: 0 /100 WBCS — SIGNIFICANT CHANGE UP (ref 0–0)
PH UR: 8 — SIGNIFICANT CHANGE UP (ref 5–8)
PLATELET # BLD AUTO: 191 K/UL — SIGNIFICANT CHANGE UP (ref 150–400)
POTASSIUM SERPL-MCNC: 3.6 MMOL/L — SIGNIFICANT CHANGE UP (ref 3.5–5.3)
POTASSIUM SERPL-SCNC: 3.6 MMOL/L — SIGNIFICANT CHANGE UP (ref 3.5–5.3)
PROT SERPL-MCNC: 7.2 G/DL — SIGNIFICANT CHANGE UP (ref 6–8.3)
PROT UR-MCNC: NEGATIVE — SIGNIFICANT CHANGE UP
RBC # BLD: 3.9 M/UL — SIGNIFICANT CHANGE UP (ref 3.8–5.2)
RBC # FLD: 13.6 % — SIGNIFICANT CHANGE UP (ref 10.3–14.5)
SODIUM SERPL-SCNC: 142 MMOL/L — SIGNIFICANT CHANGE UP (ref 135–145)
SP GR SPEC: 1.01 — SIGNIFICANT CHANGE UP (ref 1.01–1.02)
UROBILINOGEN FLD QL: NEGATIVE — SIGNIFICANT CHANGE UP
WBC # BLD: 4.68 K/UL — SIGNIFICANT CHANGE UP (ref 3.8–10.5)
WBC # FLD AUTO: 4.68 K/UL — SIGNIFICANT CHANGE UP (ref 3.8–10.5)

## 2023-04-03 PROCEDURE — 74177 CT ABD & PELVIS W/CONTRAST: CPT | Mod: MA

## 2023-04-03 PROCEDURE — 74177 CT ABD & PELVIS W/CONTRAST: CPT | Mod: 26,MA

## 2023-04-03 PROCEDURE — 36415 COLL VENOUS BLD VENIPUNCTURE: CPT

## 2023-04-03 PROCEDURE — 99284 EMERGENCY DEPT VISIT MOD MDM: CPT

## 2023-04-03 PROCEDURE — 85025 COMPLETE CBC W/AUTO DIFF WBC: CPT

## 2023-04-03 PROCEDURE — 80053 COMPREHEN METABOLIC PANEL: CPT

## 2023-04-03 PROCEDURE — 99284 EMERGENCY DEPT VISIT MOD MDM: CPT | Mod: 25

## 2023-04-03 PROCEDURE — 96360 HYDRATION IV INFUSION INIT: CPT | Mod: XU

## 2023-04-03 PROCEDURE — 83690 ASSAY OF LIPASE: CPT

## 2023-04-03 RX ORDER — SODIUM CHLORIDE 9 MG/ML
1000 INJECTION INTRAMUSCULAR; INTRAVENOUS; SUBCUTANEOUS ONCE
Refills: 0 | Status: COMPLETED | OUTPATIENT
Start: 2023-04-03 | End: 2023-04-03

## 2023-04-03 RX ADMIN — SODIUM CHLORIDE 1000 MILLILITER(S): 9 INJECTION INTRAMUSCULAR; INTRAVENOUS; SUBCUTANEOUS at 11:54

## 2023-04-03 RX ADMIN — SODIUM CHLORIDE 1000 MILLILITER(S): 9 INJECTION INTRAMUSCULAR; INTRAVENOUS; SUBCUTANEOUS at 12:54

## 2023-04-03 NOTE — ED ADULT TRIAGE NOTE - CHIEF COMPLAINT QUOTE
abdominal with diarrhea x 3 days. Denies n/v, fevers. abdominal and back pain with diarrhea x 3 days. Denies n/v, fevers.

## 2023-04-03 NOTE — ED PROVIDER NOTE - PATIENT PORTAL LINK FT
You can access the FollowMyHealth Patient Portal offered by [facility] by registering at the following website: [email or web address]. By joining [employer]’s FollowMyHealth portal, you will also be able to view your health information using other applications (apps) compatible with our system.

## 2023-04-03 NOTE — ED PROVIDER NOTE - NSFOLLOWUPINSTRUCTIONS_ED_ALL_ED_FT
Follow up with your PMD within 1-2 days.  Rest, increase your fluids, advance your activity as tolerated.   Take all of your other medications as previously prescribed.   Worsening, continued or ANY new concerning symptoms return to the emergency department.  Patient recommended to take Tylenol for pain Warm compresses

## 2023-04-03 NOTE — ED ADULT NURSE NOTE - OBJECTIVE STATEMENT
Pt came from home with c/o right lower abdominal pain, back pain and diarrhea x 3 days. Pt with no PMH. Pt Pt came from home with c/o right lower abdominal pain, back pain and diarrhea x 3 days. Pt with no PMH. Pt states that the pain feels achy and tender, and occasionally, the pain worsens. Pt denies n/v, fevers/chills or any other complaints.

## 2023-04-03 NOTE — ED PROVIDER NOTE - OBJECTIVE STATEMENT
48-year-old female came to the emergency room chief complaint of right lower quadrant abdominal pain patient is having her menstruation right now started a week ago almost at the end no nausea vomiting no fever no diarrhea

## 2023-04-03 NOTE — ED PROVIDER NOTE - PHYSICAL EXAMINATION
general:     NAD, well-nourished, well-appearing  Head:     NC/AT, EOMI, oral mucosa moist  Neck:     trachea midline  Lungs:     CTA b/l, no w/r/r  CVS:     S1S2, RRR, no m/g/r  Abd:     +BS, s/nt/nd, no organomegaly  Ext:    2+ radial and pedal pulses, no c/c/e  Neuro: AAOx3, no sensory/motor deficits

## 2023-04-03 NOTE — ED PROVIDER NOTE - CLINICAL SUMMARY MEDICAL DECISION MAKING FREE TEXT BOX
48-year-old female came to the emergency room chief complaint of right lower quadrant abdominal pain patient is having her menstruation right now started a week ago almost at the end no nausea vomiting no fever no diarrhea  CBC CMP CT abdomen and pelvis within normal limits plan to discharge the patient

## 2023-04-05 ENCOUNTER — NON-APPOINTMENT (OUTPATIENT)
Age: 48
End: 2023-04-05

## 2023-04-06 ENCOUNTER — APPOINTMENT (OUTPATIENT)
Dept: FAMILY MEDICINE | Facility: CLINIC | Age: 48
End: 2023-04-06

## 2023-05-04 ENCOUNTER — NON-APPOINTMENT (OUTPATIENT)
Age: 48
End: 2023-05-04

## 2023-06-03 ENCOUNTER — INPATIENT (INPATIENT)
Facility: HOSPITAL | Age: 48
LOS: 5 days | Discharge: HOME | DRG: 897 | End: 2023-06-09
Attending: STUDENT IN AN ORGANIZED HEALTH CARE EDUCATION/TRAINING PROGRAM | Admitting: HOSPITALIST
Payer: COMMERCIAL

## 2023-06-03 VITALS
WEIGHT: 110.01 LBS | HEIGHT: 65 IN | HEART RATE: 126 BPM | TEMPERATURE: 97 F | OXYGEN SATURATION: 96 % | DIASTOLIC BLOOD PRESSURE: 92 MMHG | SYSTOLIC BLOOD PRESSURE: 132 MMHG | RESPIRATION RATE: 16 BRPM

## 2023-06-03 DIAGNOSIS — F10.929 ALCOHOL USE, UNSPECIFIED WITH INTOXICATION, UNSPECIFIED: ICD-10-CM

## 2023-06-03 LAB
ALBUMIN SERPL ELPH-MCNC: 3.6 G/DL — SIGNIFICANT CHANGE UP (ref 3.3–5)
ALP SERPL-CCNC: 83 U/L — SIGNIFICANT CHANGE UP (ref 40–120)
ALT FLD-CCNC: 94 U/L — HIGH (ref 10–45)
ANION GAP SERPL CALC-SCNC: 14 MMOL/L — SIGNIFICANT CHANGE UP (ref 5–17)
AST SERPL-CCNC: 220 U/L — HIGH (ref 10–40)
BASOPHILS # BLD AUTO: 0.05 K/UL — SIGNIFICANT CHANGE UP (ref 0–0.2)
BASOPHILS NFR BLD AUTO: 1.2 % — SIGNIFICANT CHANGE UP (ref 0–2)
BILIRUB SERPL-MCNC: 0.2 MG/DL — SIGNIFICANT CHANGE UP (ref 0.2–1.2)
BUN SERPL-MCNC: 6 MG/DL — LOW (ref 7–23)
CALCIUM SERPL-MCNC: 8.5 MG/DL — SIGNIFICANT CHANGE UP (ref 8.4–10.5)
CHLORIDE SERPL-SCNC: 104 MMOL/L — SIGNIFICANT CHANGE UP (ref 96–108)
CO2 SERPL-SCNC: 29 MMOL/L — SIGNIFICANT CHANGE UP (ref 22–31)
CREAT SERPL-MCNC: 0.63 MG/DL — SIGNIFICANT CHANGE UP (ref 0.5–1.3)
EGFR: 109 ML/MIN/1.73M2 — SIGNIFICANT CHANGE UP
EOSINOPHIL # BLD AUTO: 0.03 K/UL — SIGNIFICANT CHANGE UP (ref 0–0.5)
EOSINOPHIL NFR BLD AUTO: 0.7 % — SIGNIFICANT CHANGE UP (ref 0–6)
ETHANOL SERPL-MCNC: 435 MG/DL — HIGH (ref 0–3)
GLUCOSE SERPL-MCNC: 98 MG/DL — SIGNIFICANT CHANGE UP (ref 70–99)
HCT VFR BLD CALC: 40.7 % — SIGNIFICANT CHANGE UP (ref 34.5–45)
HGB BLD-MCNC: 13.9 G/DL — SIGNIFICANT CHANGE UP (ref 11.5–15.5)
IMM GRANULOCYTES NFR BLD AUTO: 0.2 % — SIGNIFICANT CHANGE UP (ref 0–0.9)
LYMPHOCYTES # BLD AUTO: 1.3 K/UL — SIGNIFICANT CHANGE UP (ref 1–3.3)
LYMPHOCYTES # BLD AUTO: 31.3 % — SIGNIFICANT CHANGE UP (ref 13–44)
MAGNESIUM SERPL-MCNC: 1.4 MG/DL — LOW (ref 1.6–2.6)
MCHC RBC-ENTMCNC: 33.5 PG — SIGNIFICANT CHANGE UP (ref 27–34)
MCHC RBC-ENTMCNC: 34.2 GM/DL — SIGNIFICANT CHANGE UP (ref 32–36)
MCV RBC AUTO: 98.1 FL — SIGNIFICANT CHANGE UP (ref 80–100)
MONOCYTES # BLD AUTO: 0.21 K/UL — SIGNIFICANT CHANGE UP (ref 0–0.9)
MONOCYTES NFR BLD AUTO: 5.1 % — SIGNIFICANT CHANGE UP (ref 2–14)
NEUTROPHILS # BLD AUTO: 2.55 K/UL — SIGNIFICANT CHANGE UP (ref 1.8–7.4)
NEUTROPHILS NFR BLD AUTO: 61.5 % — SIGNIFICANT CHANGE UP (ref 43–77)
NRBC # BLD: 0 /100 WBCS — SIGNIFICANT CHANGE UP (ref 0–0)
PHOSPHATE SERPL-MCNC: 4.5 MG/DL — SIGNIFICANT CHANGE UP (ref 2.5–4.5)
PLATELET # BLD AUTO: 141 K/UL — LOW (ref 150–400)
POTASSIUM SERPL-MCNC: 3.7 MMOL/L — SIGNIFICANT CHANGE UP (ref 3.5–5.3)
POTASSIUM SERPL-SCNC: 3.7 MMOL/L — SIGNIFICANT CHANGE UP (ref 3.5–5.3)
PROT SERPL-MCNC: 7.2 G/DL — SIGNIFICANT CHANGE UP (ref 6–8.3)
RBC # BLD: 4.15 M/UL — SIGNIFICANT CHANGE UP (ref 3.8–5.2)
RBC # FLD: 13.1 % — SIGNIFICANT CHANGE UP (ref 10.3–14.5)
SODIUM SERPL-SCNC: 147 MMOL/L — HIGH (ref 135–145)
WBC # BLD: 4.15 K/UL — SIGNIFICANT CHANGE UP (ref 3.8–10.5)
WBC # FLD AUTO: 4.15 K/UL — SIGNIFICANT CHANGE UP (ref 3.8–10.5)

## 2023-06-03 PROCEDURE — 93010 ELECTROCARDIOGRAM REPORT: CPT

## 2023-06-03 PROCEDURE — 99223 1ST HOSP IP/OBS HIGH 75: CPT

## 2023-06-03 PROCEDURE — 99285 EMERGENCY DEPT VISIT HI MDM: CPT

## 2023-06-03 PROCEDURE — 93970 EXTREMITY STUDY: CPT | Mod: 26

## 2023-06-03 RX ORDER — SODIUM CHLORIDE 9 MG/ML
1000 INJECTION, SOLUTION INTRAVENOUS
Refills: 0 | Status: DISCONTINUED | OUTPATIENT
Start: 2023-06-03 | End: 2023-06-04

## 2023-06-03 RX ORDER — FOLIC ACID 0.8 MG
1 TABLET ORAL DAILY
Refills: 0 | Status: DISCONTINUED | OUTPATIENT
Start: 2023-06-03 | End: 2023-06-09

## 2023-06-03 RX ORDER — SODIUM CHLORIDE 9 MG/ML
1000 INJECTION INTRAMUSCULAR; INTRAVENOUS; SUBCUTANEOUS ONCE
Refills: 0 | Status: COMPLETED | OUTPATIENT
Start: 2023-06-03 | End: 2023-06-03

## 2023-06-03 RX ORDER — ACETAMINOPHEN 500 MG
650 TABLET ORAL EVERY 6 HOURS
Refills: 0 | Status: DISCONTINUED | OUTPATIENT
Start: 2023-06-03 | End: 2023-06-09

## 2023-06-03 RX ORDER — SODIUM CHLORIDE 9 MG/ML
1000 INJECTION, SOLUTION INTRAVENOUS
Refills: 0 | Status: DISCONTINUED | OUTPATIENT
Start: 2023-06-03 | End: 2023-06-03

## 2023-06-03 RX ORDER — THIAMINE MONONITRATE (VIT B1) 100 MG
100 TABLET ORAL DAILY
Refills: 0 | Status: COMPLETED | OUTPATIENT
Start: 2023-06-03 | End: 2023-06-06

## 2023-06-03 RX ORDER — ONDANSETRON 8 MG/1
4 TABLET, FILM COATED ORAL EVERY 8 HOURS
Refills: 0 | Status: DISCONTINUED | OUTPATIENT
Start: 2023-06-03 | End: 2023-06-09

## 2023-06-03 RX ADMIN — SODIUM CHLORIDE 2000 MILLILITER(S): 9 INJECTION INTRAMUSCULAR; INTRAVENOUS; SUBCUTANEOUS at 15:17

## 2023-06-03 RX ADMIN — Medication 1 MILLIGRAM(S): at 22:14

## 2023-06-03 NOTE — PATIENT PROFILE ADULT - FALL HARM RISK - HARM RISK INTERVENTIONS

## 2023-06-03 NOTE — ED PROVIDER NOTE - ATTENDING APP SHARED VISIT CONTRIBUTION OF CARE
Dr. Perez: I performed a face to face bedside interview with patient regarding history of present illness, review of symptoms and past medical history. I completed an independent physical exam.  I have discussed patient's plan of care with PA.   I agree with note as stated above, having amended the EMR as needed to reflect my findings.   This includes HISTORY OF PRESENT ILLNESS, HIV, PAST MEDICAL/SURGICAL/FAMILY/SOCIAL HISTORY, ALLERGIES AND HOME MEDICATIONS, REVIEW OF SYSTEMS, PHYSICAL EXAM, and any PROGRESS NOTES during the time I functioned as the attending physician for this patient.     dr perez: 48-year-old female with no reported medical history other than chronic alcohol abuse comes ED accompanied by her sisters and , reporting that she wants alcohol detoxification.  Patient reportedly drinks tequila daily, last drink this morning.  Also complaining of approximately 3 weeks of intermittent bilateral lower leg pain.  As per the patient's  patient is also been getting "shaky". Patient denies any nausea, vomiting, diarrhea, chest pain, abdominal pain, jaundice, SOB, dizziness, headache, numbness or tingling. Patient reports she is currently undergoing menopause, with hot flashes and diaphoresis.    Patient tachycardic, very faint tremor noted on examination,  Some slight slurring of speech.    Patient's  and sisters requesting admission for detox patient agrees with request and states she would be willing to stay, would like to get sober as well.      CBC, CMP, BAL, IVF, ECG, b/l LE Doppler, re-assess.    d/w dr noble admission

## 2023-06-03 NOTE — H&P ADULT - HISTORY OF PRESENT ILLNESS
49yo female with hx of chronic EtOH abuse, presented to the ED accompanied by her 2 sisters for detox. Pt states she has been drinking quarter bottle of tequila daily x 6months. She was in a program 15years ago and was sober until the past 1.5 years. She has gone through withdrawals in the past. Denies requiring admission into the ICU. Currently pt denies cp, palpitations, sob, abd pain, N/V, fever, chills.

## 2023-06-03 NOTE — PATIENT PROFILE ADULT - NSPROPTRIGHTSUPPORTPHONE_GEN_A_NUR
Pt positive for COVID-19. CXR with bilateral infiltrates  Now with downtrending leukocytosis and downtrending inflammatory biomarkers (D-dimer, CRP). Stable procalcitonin.   - s/p COVID bundle: azithromycin, hydroxychloroquine outpatient  - s/p tociluzumab (4/2)   - c/w methylprednisolone 40mg IV BID (4/2-4/6)   - Blood cx  NGTD   - Tylenol PRN for fever  - Robitussin PRN for cough  - Avoid NSAIDs, ACE/ARB  - no nebulizers, MDI only  - COVID Isolation precautions: contact and droplet   - continue to monitor daily CBC w/ differential, CMP, Mg, Phos, CRP, procalcitonin, and ABG  - q3d labs: ESR, Tcell subset, d-dimer, LDH, ferritin, CK, trop, coags [id number]

## 2023-06-03 NOTE — ED PROVIDER NOTE - CLINICAL SUMMARY MEDICAL DECISION MAKING FREE TEXT BOX
48-year-old female with no reported medical history other than chronic alcohol abuse comes ED accompanied by her sisters and , reporting that she wants alcohol detoxification.  Patient reportedly drinks tequila daily, last drink this morning.  Also complaining of approximately 3 weeks of intermittent bilateral lower leg pain.  As per the patient's  patient is also been getting "shaky".    Patient tachycardic, very faint tremor noted on examination,  Some slight slurring of speech.    Patient's  and sisters requesting admission for detox patient agrees with request and states she would be willing to stay, would like to get sober as well.      CBC, CMP, BAL, IVF, ECG, b/l LE Doppler, re-assess. 48-year-old female with no reported medical history other than chronic alcohol abuse comes ED accompanied by her sisters and , reporting that she wants alcohol detoxification.  Patient reportedly drinks tequila daily, last drink this morning.  Also complaining of approximately 3 weeks of intermittent bilateral lower leg pain.  As per the patient's  patient is also been getting "shaky". Patient denies any nausea, vomiting, diarrhea, chest pain, abdominal pain, jaundice, SOB, dizziness, headache, numbness or tingling. Patient reports she is currently undergoing menopause, with hot flashes and diaphoresis.    Patient tachycardic, very faint tremor noted on examination,  Some slight slurring of speech.    Patient's  and sisters requesting admission for detox patient agrees with request and states she would be willing to stay, would like to get sober as well.      CBC, CMP, BAL, IVF, ECG, b/l LE Doppler, re-assess.    d/w dr noble admission

## 2023-06-03 NOTE — ED ADULT NURSE NOTE - OBJECTIVE STATEMENT
Patient brought in by family for detox for alcohol. Pt admits to drinking tequila, last drink was this morning. Alert and oriented x 4. No signs or symptoms of nausea, vomiitng, dizzines or SOB. Also complaint of bilateral generalized leg pain.

## 2023-06-03 NOTE — ED PROVIDER NOTE - MUSCULOSKELETAL, MLM
Spine appears normal, range of motion is not limited, no muscle or joint tenderness. NO calf tenderness b/l, no palpable cords, faustina's sign negative.

## 2023-06-03 NOTE — ED ADULT TRIAGE NOTE - CHIEF COMPLAINT QUOTE
BIB family for detox for alcohol. Pt admits to drinking tequila, last drink was this morning. Pt c/o bilateral leg pain.

## 2023-06-03 NOTE — ED PROVIDER NOTE - CRANIAL NERVE AND PUPILLARY EXAM
No nystagmus, tongue midline, faint fasciculation of tongue noted with extension. Faint upper extremity tremors noted./extra-ocular movements intact/tongue is midline

## 2023-06-03 NOTE — H&P ADULT - ASSESSMENT
47yo female with hx of chronic EtOH abuse, presented to the ED accompanied by her 2 sisters for detox    #EtOH intoxication  -DOUG elevated  -Not in withdrawal yet, however will soon  -CIWA Protocol w/ symptom triggered Ativan  -Continue IVF  -Thiamine/Folate/Multivitamin  - consult for Detox  -Seizure precaution  -Aspiration precaution    #Hypernatremia  -Continue IVF  -Monitor    #Transaminitis  #Hepatic Steatosis  -EtOH induced  -CT A/P revealed Steatosis with areas of more focal fatty infiltration  -Monitor    #VTE ppx  Low risk

## 2023-06-03 NOTE — ED ADULT NURSE NOTE - NSFALLRISKINTERV_ED_ALL_ED

## 2023-06-03 NOTE — PATIENT PROFILE ADULT - ARE SIGNIFICANT INDICATORS COMPLETE.
No Subjective   Myke Marcial is a 51 y.o. male.     History of Present Illness  Mr. Marcial presents today for f/u on several chronic medical problems.     He has IDDM which has not been well controlled. Taking basal insulin and oral meds but not checking BG on regular basis. When he does check in AM, generally around 200. He is not exercising, not following diabetic appropriate diet. He is pleased that his diabetic foot ulcer has completely healed.      Has HTN. Has been well controlled on norvasc, HCTZ and ARB. Taking meds as rx'd. Denies side effects. Denies CP, palpitations, orthopnea. Stable mild swelling in feet.     Has HLP for which he is taking statin. Tolerating well. Denies symptoms of claudication.    Has PRATEEK and his using his CPAP regularly. Denies apneic spells. Daytime sleepiness has improved.      The following portions of the patient's history were reviewed and updated as appropriate: allergies, current medications, past family history, past medical history, past social history, past surgical history and problem list.    Review of Systems   Constitutional: Positive for fatigue. Negative for appetite change and fever.   HENT: Negative for congestion, ear pain, hearing loss, nosebleeds, rhinorrhea, sneezing, sore throat, tinnitus and trouble swallowing.    Eyes: Negative for pain, discharge, redness and visual disturbance.   Respiratory: Negative for cough, chest tightness, shortness of breath and wheezing.    Cardiovascular: Positive for leg swelling. Negative for chest pain and palpitations.   Gastrointestinal: Negative for abdominal pain, blood in stool, constipation, diarrhea, nausea and vomiting.   Endocrine: Negative for cold intolerance, heat intolerance, polydipsia and polyuria.   Genitourinary: Negative for dysuria, flank pain, frequency, hematuria and urgency.        ED   Musculoskeletal: Positive for back pain (chronic, stable). Negative for arthralgias, joint swelling and myalgias.    Skin: Negative for rash and wound.   Neurological: Negative for dizziness, tremors, seizures, syncope, speech difficulty, weakness, numbness and headaches.   Hematological: Negative for adenopathy. Does not bruise/bleed easily.   Psychiatric/Behavioral: Negative for confusion, dysphoric mood, sleep disturbance and suicidal ideas. The patient is not nervous/anxious.        Objective    Vitals:    03/01/19 0803   BP: 112/72   Pulse: 90   SpO2: 98%     Body mass index is 41.79 kg/m².      03/01/19  0803   Weight: 128 kg (283 lb)       Physical Exam   Constitutional: He is oriented to person, place, and time. He appears well-developed and well-nourished. He is cooperative. He does not appear ill. No distress.   morbidly obese   HENT:   Head: Normocephalic and atraumatic.   Mouth/Throat: Mucous membranes are normal. Mucous membranes are not dry.   Cardiovascular: Normal rate, regular rhythm, normal heart sounds and intact distal pulses.   Pulmonary/Chest: Effort normal and breath sounds normal.    Myke had a diabetic foot exam performed today.   During the foot exam he had a monofilament test performed (normal).  Vascular Status -  His right foot exhibits no edema. His left foot exhibits no edema.  Skin Integrity  -  His right foot skin is intact. He has callous right foot.  He has no right foot ulcer.His left foot skin is intact.He has callous left foot. He has no left foot ulcer..  Neurological: He is alert and oriented to person, place, and time. He displays no tremor. Gait normal.   Skin: Skin is warm and dry. No bruising and no rash noted.   Much improved diabetic foot ulcer right plantar foot, no drainage or cellulitic change   Psychiatric: He has a normal mood and affect. His behavior is normal. Cognition and memory are normal.   Good eye contact. Answers questions appropriately. Good personal hygiene and grooming.   Nursing note and vitals reviewed.      Lab Results   Component Value Date    HGBA1C 11.0  03/01/2019     Assessment/Plan   Myke was seen today for diabetes, hyperlipidemia and hypertension.    Diagnoses and all orders for this visit:    Type 2 diabetes mellitus with complication, with long-term current use of insulin (CMS/[facility])  -     POC Glucose  -     POC Glycated Hemoglobin, Total  -     CBC (No Diff)  -     Comprehensive Metabolic Panel    Essential hypertension  -     CBC (No Diff)  -     Comprehensive Metabolic Panel  -     TSH Rfx On Abnormal To Free T4    Mixed hyperlipidemia  -     Comprehensive Metabolic Panel  -     Lipid Panel    PRATEEK on CPAP  -     CBC (No Diff)    Other orders  -     Insulin Glargine (BASAGLAR KWIKPEN) 100 UNIT/ML injection pen; Inject 80 Units under the skin into the appropriate area as directed Every Night.       IDDM uncontrolled. Continue amaryl, metformin, and uptitrate basal insulin by 5 units every 5 days for fasting blood sugar over 120. Patient encouraged to take meds as rx'd, follow diabetic appropriate diet, exercise daily, perform feet check daily, check BG daily/record, and have yearly diabetic eye exams.    HTN controlled. Continue norvasc, HCTZ and ARB. Pt advised to eat a heart healthy diet and get regular aerobic exercise.    HLP with good tolerance of statin. FLP, LFTs as above.    Encouraged CPAP complaince in addition to weight loss efforts. Nutrition and activity goals reviewed including: mainly water to drink, limit white flour/processed sugar, high protein, high fiber carbs, good breakfast, working toward 150 mins cardio per week, resistance training 2x/week.    .I will contact patient regarding test results and provide instructions regarding any necessary changes in plan of care.  Routine f/u in 3 months, sooner as needed/instructed.  Patient was encouraged to keep me informed of any acute changes, lack of improvement, or any new concerning symptoms.  Pt is aware of reasons to seek emergent care.  Patient voiced understanding of all instructions and  denied further questions.                Yes

## 2023-06-03 NOTE — ED PROVIDER NOTE - OBJECTIVE STATEMENT
48-year-old female with no reported medical history other than chronic alcohol abuse comes ED accompanied by her sisters and , reporting that she wants alcohol detoxification.  Patient reportedly drinks tequila daily, last drink this morning.  Also complaining of approximately 3 weeks of intermittent bilateral lower leg pain.  As per the patient's  patient is also been getting "shaky". Patient denies any nausea, vomiting, diarrhea, chest pain, abdominal pain, jaundice, SOB, dizziness, headache, numbness or tingling. Patient reports she is currently undergoing menopause, with hot flashes and diaphoresis.

## 2023-06-03 NOTE — ED ADULT NURSE NOTE - BREATHING, MLM
Group Topic:  RADHA Process Group    Date: 4/13/2022  Start Time: 1000  End Time: 1100  Facilitators: Ngozi Jj LCSW    Focus: Morning check-in process group  Number in attendance: 9      Goals: Process group is structured time for Patients to engage in healthy self disclosure to increase problem solving skills while being open to constructive feedback from community. Topics and themes commonly discussed are relapse, coping skills, conflict, stress, gratitude, spirituality and addiction education oriented. While in process, the group facilitators ensure group safety and cohesion.     Patient/Resident Check-in Self report    Last self reported use: 3/16/22  Substance(s) of choice: Alcohol  Number of hours of sleep: 6.5  Difficulty falling Asleep: No  Difficulty staying asleep:No  Current dreams about consuming substances or night terrors: No    Patient reported appetite as: good  Number of meals in the last 24 hours: 3    My Mood is: \"a bit nervous\" and \"good\"    Depressive symptoms: Yes  If yes, (0=none, 10= worst) 2    Anxiety symptoms: Yes  If yes, (0=none, 10= worst) 4    Cravings: No  If yes, (0=none, 10= worst) 0    Attended a recovery meeting last night and/or this morning: Yes  Number of recovery meetings attended since Sunday: 4    Taking medications as prescribed: Yes    Feelings of hopelessness or helplessness: No  Thoughts of wising you were dead or would harm self: No  Thoughts of harming someone else: No    Recovery goal/task for the day: \"Have a positive/constructive family meeting\"    Preferred Level of Care:  AODA (alcohol and other drug abuse) Partial Hospital Program (AODA PHP), AODA (alcohol and other drug abuse) Intensive Outpatient Program (AODA IOP); Outpatient AODA (alcohol and other drug abuse) treatment; and Community Resources and support groups     Aftercare Concerns: Yes- \"Telling myself I can drink responsibly\"    CLAUDIA Cano, APSW, SAC-IT  4/13/22           Spontaneous, unlabored and symmetrical

## 2023-06-03 NOTE — ED PROVIDER NOTE - PHYSICAL EXAMINATION
Gen:  alert, awake, no acute distress  Head:  atraumatic, normocephalic  HEENT: PERRLA, EOMI, normal nose, normal oropharynx, no tonsillar edema, erythema, or exudate  CV:  rrr, nl S1, S2, no m/r/g  Pulm:  lungs CTA b/l  Abd: s/nt/nd, +BS  MSK:  moving all extremities, no back midline ttp, no stepoffs, no cva TTP, mild b/l lateral shin tenderness  Neuro:  grossly intact, no focal deficits, mild tremor, slightly slow to respond  Skin:  clear, dry, intact  Psych: AOx3, normal affect, no apparent risk to self or others

## 2023-06-04 LAB
ALBUMIN SERPL ELPH-MCNC: 3 G/DL — LOW (ref 3.3–5)
ALP SERPL-CCNC: 64 U/L — SIGNIFICANT CHANGE UP (ref 40–120)
ALT FLD-CCNC: 73 U/L — HIGH (ref 10–45)
ANION GAP SERPL CALC-SCNC: 10 MMOL/L — SIGNIFICANT CHANGE UP (ref 5–17)
ANION GAP SERPL CALC-SCNC: 9 MMOL/L — SIGNIFICANT CHANGE UP (ref 5–17)
AST SERPL-CCNC: 153 U/L — HIGH (ref 10–40)
BILIRUB SERPL-MCNC: 0.5 MG/DL — SIGNIFICANT CHANGE UP (ref 0.2–1.2)
BUN SERPL-MCNC: 4 MG/DL — LOW (ref 7–23)
BUN SERPL-MCNC: 4 MG/DL — LOW (ref 7–23)
CALCIUM SERPL-MCNC: 7.5 MG/DL — LOW (ref 8.4–10.5)
CALCIUM SERPL-MCNC: 7.9 MG/DL — LOW (ref 8.4–10.5)
CHLORIDE SERPL-SCNC: 100 MMOL/L — SIGNIFICANT CHANGE UP (ref 96–108)
CHLORIDE SERPL-SCNC: 101 MMOL/L — SIGNIFICANT CHANGE UP (ref 96–108)
CO2 SERPL-SCNC: 27 MMOL/L — SIGNIFICANT CHANGE UP (ref 22–31)
CO2 SERPL-SCNC: 30 MMOL/L — SIGNIFICANT CHANGE UP (ref 22–31)
CREAT SERPL-MCNC: 0.38 MG/DL — LOW (ref 0.5–1.3)
CREAT SERPL-MCNC: 0.41 MG/DL — LOW (ref 0.5–1.3)
EGFR: 120 ML/MIN/1.73M2 — SIGNIFICANT CHANGE UP
EGFR: 123 ML/MIN/1.73M2 — SIGNIFICANT CHANGE UP
FOLATE SERPL-MCNC: >20 NG/ML — SIGNIFICANT CHANGE UP
GLUCOSE SERPL-MCNC: 128 MG/DL — HIGH (ref 70–99)
GLUCOSE SERPL-MCNC: 84 MG/DL — SIGNIFICANT CHANGE UP (ref 70–99)
HCT VFR BLD CALC: 32.7 % — LOW (ref 34.5–45)
HGB BLD-MCNC: 11 G/DL — LOW (ref 11.5–15.5)
MAGNESIUM SERPL-MCNC: 1 MG/DL — CRITICAL LOW (ref 1.6–2.6)
MAGNESIUM SERPL-MCNC: 2.1 MG/DL — SIGNIFICANT CHANGE UP (ref 1.6–2.6)
MCHC RBC-ENTMCNC: 32.8 PG — SIGNIFICANT CHANGE UP (ref 27–34)
MCHC RBC-ENTMCNC: 33.6 GM/DL — SIGNIFICANT CHANGE UP (ref 32–36)
MCV RBC AUTO: 97.6 FL — SIGNIFICANT CHANGE UP (ref 80–100)
NRBC # BLD: 0 /100 WBCS — SIGNIFICANT CHANGE UP (ref 0–0)
PHOSPHATE SERPL-MCNC: 3 MG/DL — SIGNIFICANT CHANGE UP (ref 2.5–4.5)
PLATELET # BLD AUTO: 104 K/UL — LOW (ref 150–400)
POTASSIUM SERPL-MCNC: 2.6 MMOL/L — CRITICAL LOW (ref 3.5–5.3)
POTASSIUM SERPL-MCNC: 3.3 MMOL/L — LOW (ref 3.5–5.3)
POTASSIUM SERPL-SCNC: 2.6 MMOL/L — CRITICAL LOW (ref 3.5–5.3)
POTASSIUM SERPL-SCNC: 3.3 MMOL/L — LOW (ref 3.5–5.3)
PROT SERPL-MCNC: 6 G/DL — SIGNIFICANT CHANGE UP (ref 6–8.3)
RBC # BLD: 3.35 M/UL — LOW (ref 3.8–5.2)
RBC # FLD: 12.7 % — SIGNIFICANT CHANGE UP (ref 10.3–14.5)
SODIUM SERPL-SCNC: 138 MMOL/L — SIGNIFICANT CHANGE UP (ref 135–145)
SODIUM SERPL-SCNC: 139 MMOL/L — SIGNIFICANT CHANGE UP (ref 135–145)
VIT B12 SERPL-MCNC: 491 PG/ML — SIGNIFICANT CHANGE UP (ref 232–1245)
WBC # BLD: 4.1 K/UL — SIGNIFICANT CHANGE UP (ref 3.8–10.5)
WBC # FLD AUTO: 4.1 K/UL — SIGNIFICANT CHANGE UP (ref 3.8–10.5)

## 2023-06-04 PROCEDURE — 99233 SBSQ HOSP IP/OBS HIGH 50: CPT

## 2023-06-04 RX ORDER — MAGNESIUM SULFATE 500 MG/ML
2 VIAL (ML) INJECTION EVERY 4 HOURS
Refills: 0 | Status: DISCONTINUED | OUTPATIENT
Start: 2023-06-04 | End: 2023-06-04

## 2023-06-04 RX ORDER — POTASSIUM CHLORIDE 20 MEQ
10 PACKET (EA) ORAL
Refills: 0 | Status: COMPLETED | OUTPATIENT
Start: 2023-06-04 | End: 2023-06-04

## 2023-06-04 RX ORDER — MAGNESIUM SULFATE 500 MG/ML
2 VIAL (ML) INJECTION ONCE
Refills: 0 | Status: COMPLETED | OUTPATIENT
Start: 2023-06-04 | End: 2023-06-04

## 2023-06-04 RX ORDER — MAGNESIUM SULFATE 500 MG/ML
2 VIAL (ML) INJECTION
Refills: 0 | Status: COMPLETED | OUTPATIENT
Start: 2023-06-04 | End: 2023-06-04

## 2023-06-04 RX ORDER — SODIUM CHLORIDE 9 MG/ML
1000 INJECTION INTRAMUSCULAR; INTRAVENOUS; SUBCUTANEOUS
Refills: 0 | Status: DISCONTINUED | OUTPATIENT
Start: 2023-06-04 | End: 2023-06-05

## 2023-06-04 RX ORDER — POTASSIUM CHLORIDE 20 MEQ
40 PACKET (EA) ORAL EVERY 4 HOURS
Refills: 0 | Status: COMPLETED | OUTPATIENT
Start: 2023-06-04 | End: 2023-06-04

## 2023-06-04 RX ORDER — POTASSIUM CHLORIDE 20 MEQ
40 PACKET (EA) ORAL ONCE
Refills: 0 | Status: COMPLETED | OUTPATIENT
Start: 2023-06-04 | End: 2023-06-04

## 2023-06-04 RX ADMIN — Medication 1 MILLIGRAM(S): at 11:16

## 2023-06-04 RX ADMIN — Medication 2 MILLIGRAM(S): at 21:36

## 2023-06-04 RX ADMIN — Medication 100 MILLIEQUIVALENT(S): at 11:08

## 2023-06-04 RX ADMIN — Medication 100 MILLIEQUIVALENT(S): at 14:07

## 2023-06-04 RX ADMIN — Medication 1 MILLIGRAM(S): at 05:08

## 2023-06-04 RX ADMIN — Medication 2 MILLIGRAM(S): at 08:23

## 2023-06-04 RX ADMIN — Medication 2 MILLIGRAM(S): at 15:51

## 2023-06-04 RX ADMIN — Medication 1 MILLIGRAM(S): at 03:09

## 2023-06-04 RX ADMIN — Medication 100 MILLIEQUIVALENT(S): at 13:38

## 2023-06-04 RX ADMIN — Medication 2 MILLIGRAM(S): at 11:38

## 2023-06-04 RX ADMIN — Medication 40 MILLIEQUIVALENT(S): at 08:53

## 2023-06-04 RX ADMIN — Medication 25 GRAM(S): at 15:39

## 2023-06-04 RX ADMIN — Medication 25 GRAM(S): at 17:31

## 2023-06-04 RX ADMIN — Medication 100 MILLIGRAM(S): at 11:16

## 2023-06-04 RX ADMIN — Medication 1 TABLET(S): at 11:16

## 2023-06-04 RX ADMIN — Medication 40 MILLIEQUIVALENT(S): at 20:55

## 2023-06-04 RX ADMIN — Medication 40 MILLIEQUIVALENT(S): at 15:39

## 2023-06-04 RX ADMIN — Medication 25 GRAM(S): at 09:16

## 2023-06-04 NOTE — PROGRESS NOTE ADULT - ASSESSMENT
49yo female with hx of chronic EtOH abuse, presented to the ED accompanied by her 2 sisters for detox    #EtOH intoxication  -DOUG elevated  -Not in withdrawal yet, however will soon  -CIWA Protocol w/ symptom triggered Ativan  -Continue IVF  -Thiamine/Folate/Multivitamin  - consult for Detox  -Seizure precaution  -Aspiration precaution    #Hypernatremia  -Continue IVF  -Monitor    #Transaminitis  #Hepatic Steatosis  -EtOH induced  -CT A/P revealed Steatosis with areas of more focal fatty infiltration  -Monitor    #VTE ppx  Low risk   49yo female with hx of chronic EtOH abuse, presented to the ED accompanied by her 2 sisters for detox    #EtOH withdrawal   -DOUG elevated on arrival  -CIWA Protocol w/ symptom triggered Ativan.  - IV ativan PRN moderate/severe tremors  -s/p IVF  -Thiamine/Folate/Multivitamin  - consult for Detox  -Seizure precaution  -Aspiration precaution    #Hypokalemia  #Hypomagnesia  -replete  -repeat labs this afternoon  -monitor    #Hypernatremia  -improved s/p IVF  -Monitor    #Transaminitis  #Hepatic Steatosis  -EtOH induced  -CT A/P revealed Steatosis with areas of more focal fatty infiltration  -Monitor    #VTE ppx  Low risk    Will update family individually

## 2023-06-04 NOTE — PROGRESS NOTE ADULT - SUBJECTIVE AND OBJECTIVE BOX
Patient is a 48y old  Female who presents with a chief complaint of Alcohol intoxication (03 Jun 2023 17:12)      Patient seen and examined at bedside. No overnight events.     ALLERGIES:  niacin (Unknown)  Aleve (Unknown)  penicillin (Unknown)    MEDICATIONS  (STANDING):  dextrose 5% + sodium chloride 0.45%. 1000 milliLiter(s) (100 mL/Hr) IV Continuous <Continuous>  folic acid 1 milliGRAM(s) Oral daily  multivitamin 1 Tablet(s) Oral daily  thiamine 100 milliGRAM(s) Oral daily    MEDICATIONS  (PRN):  acetaminophen     Tablet .. 650 milliGRAM(s) Oral every 6 hours PRN Temp greater or equal to 38C (100.4F), Mild Pain (1 - 3)  LORazepam     Tablet 1 milliGRAM(s) Oral every 2 hours PRN Symptom-triggered 2 point increase in CIWA-Ar  ondansetron Injectable 4 milliGRAM(s) IV Push every 8 hours PRN Nausea and/or Vomiting    Vital Signs Last 24 Hrs  T(F): 99 (04 Jun 2023 05:51), Max: 99 (03 Jun 2023 18:04)  HR: 97 (04 Jun 2023 05:51) (97 - 126)  BP: 123/86 (04 Jun 2023 05:51) (104/71 - 132/92)  RR: 17 (04 Jun 2023 05:51) (15 - 17)  SpO2: 97% (04 Jun 2023 05:51) (94% - 98%)  I&O's Summary    PHYSICAL EXAM:  General: NAD, A/O x 3  ENT: MMM, no oral thrush   Neck: Supple, No JVD  Lungs: Clear to auscultation bilaterally, non labored breathing  Cardio: RRR, S1/S2, No murmurs  Abdomen: Soft, Nontender, Nondistended; Bowel sounds present  Extremities: No calf tenderness, No pitting edema    LABS:                        13.9   4.15  )-----------( 141      ( 03 Jun 2023 15:08 )             40.7     06-03    147  |  104  |  6   ----------------------------<  98  3.7   |  29  |  0.63    Ca    8.5      03 Jun 2023 15:08  Phos  4.5     06-03  Mg     1.4     06-03    TPro  7.2  /  Alb  3.6  /  TBili  0.2  /  DBili  x   /  AST  220  /  ALT  94  /  AlkPhos  83  06-03                                        RADIOLOGY & ADDITIONAL TESTS:    Care Discussed with Consultants/Other Providers:    Patient is a 48y old  Female who presents with a chief complaint of Alcohol intoxication (03 Jun 2023 17:12)      Patient seen and examined at bedside. + tremulous. States she feels better after receiving the ativan. Denies further complaints.     ALLERGIES:  niacin (Unknown)  Aleve (Unknown)  penicillin (Unknown)    MEDICATIONS  (STANDING):  dextrose 5% + sodium chloride 0.45%. 1000 milliLiter(s) (100 mL/Hr) IV Continuous <Continuous>  folic acid 1 milliGRAM(s) Oral daily  multivitamin 1 Tablet(s) Oral daily  thiamine 100 milliGRAM(s) Oral daily    MEDICATIONS  (PRN):  acetaminophen     Tablet .. 650 milliGRAM(s) Oral every 6 hours PRN Temp greater or equal to 38C (100.4F), Mild Pain (1 - 3)  LORazepam     Tablet 1 milliGRAM(s) Oral every 2 hours PRN Symptom-triggered 2 point increase in CIWA-Ar  ondansetron Injectable 4 milliGRAM(s) IV Push every 8 hours PRN Nausea and/or Vomiting    Vital Signs Last 24 Hrs  T(F): 99 (04 Jun 2023 05:51), Max: 99 (03 Jun 2023 18:04)  HR: 97 (04 Jun 2023 05:51) (97 - 126)  BP: 123/86 (04 Jun 2023 05:51) (104/71 - 132/92)  RR: 17 (04 Jun 2023 05:51) (15 - 17)  SpO2: 97% (04 Jun 2023 05:51) (94% - 98%)  I&O's Summary    PHYSICAL EXAM:  General: + Tremulous, A/O x 3  ENT: MMM, no oral thrush   Neck: Supple, No JVD  Lungs: Clear to auscultation bilaterally, non labored breathing  Cardio: RRR, S1/S2, No murmurs  Abdomen: Soft, Nontender, Nondistended; Bowel sounds present  Extremities: No calf tenderness, No pitting edema    LABS:                        13.9   4.15  )-----------( 141      ( 03 Jun 2023 15:08 )             40.7     06-03    147  |  104  |  6   ----------------------------<  98  3.7   |  29  |  0.63    Ca    8.5      03 Jun 2023 15:08  Phos  4.5     06-03  Mg     1.4     06-03    TPro  7.2  /  Alb  3.6  /  TBili  0.2  /  DBili  x   /  AST  220  /  ALT  94  /  AlkPhos  83  06-03                                        RADIOLOGY & ADDITIONAL TESTS:  < from: US Duplex Venous Lower Ext Complete, Bilateral (06.03.23 @ 14:57) >  IMPRESSION:  No evidence of deep venous thrombosis in either lower extremity.            --- End of Report ---            RADHA EVANS MD; Attending Radiologist  This document has been electronically signed. Krish  3 2023  2:59PM    < end of copied text >    Care Discussed with Consultants/Other Providers:

## 2023-06-04 NOTE — PROVIDER CONTACT NOTE (CRITICAL VALUE NOTIFICATION) - SITUATION
Patient AOx3, admitted with alcohol withdrawal, history of alcohol abuse, CIWA protocol in place magnesium of 1
Patient admitted for alcohol withdrawal, history of alcohol abuse, CIWA protocol in place, potassium of 2.6

## 2023-06-04 NOTE — PROGRESS NOTE ADULT - NS ATTEND AMEND GEN_ALL_CORE FT
49 y/o F with hx of chronic EtOH abuse, presented to the ED accompanied by her 2 sisters, admitted for EtOH withdrawal, consideration for detox/rehab. +tremulous, nauseous this am. cont CIWA, IVF, b12/folate/mvt. replete electrolytes as indicated. SW consult. monitor lfts for transaminitis, hepatic steatosis

## 2023-06-04 NOTE — PROVIDER CONTACT NOTE (CRITICAL VALUE NOTIFICATION) - ACTION/TREATMENT ORDERED:
ANNIE Ferraro notified and aware, IV Magnesium ordered.
ANNIE Ferraro notified and aware, ordered potassium IV riders x3, and 2x 40 meq potassium tablets.

## 2023-06-04 NOTE — PROVIDER CONTACT NOTE (CRITICAL VALUE NOTIFICATION) - BACKGROUND
History of alcohol abuse, [facility] protocol in place
history of alcohol abuse, [facility] protocol in place

## 2023-06-05 LAB
ANION GAP SERPL CALC-SCNC: 14 MMOL/L — SIGNIFICANT CHANGE UP (ref 5–17)
BUN SERPL-MCNC: 4 MG/DL — LOW (ref 7–23)
CALCIUM SERPL-MCNC: 7.3 MG/DL — LOW (ref 8.4–10.5)
CHLORIDE SERPL-SCNC: 102 MMOL/L — SIGNIFICANT CHANGE UP (ref 96–108)
CO2 SERPL-SCNC: 21 MMOL/L — LOW (ref 22–31)
CREAT SERPL-MCNC: 0.41 MG/DL — LOW (ref 0.5–1.3)
EGFR: 121 ML/MIN/1.73M2 — SIGNIFICANT CHANGE UP
GLUCOSE SERPL-MCNC: 73 MG/DL — SIGNIFICANT CHANGE UP (ref 70–99)
HCT VFR BLD CALC: 35.3 % — SIGNIFICANT CHANGE UP (ref 34.5–45)
HGB BLD-MCNC: 11.6 G/DL — SIGNIFICANT CHANGE UP (ref 11.5–15.5)
MAGNESIUM SERPL-MCNC: 1.9 MG/DL — SIGNIFICANT CHANGE UP (ref 1.6–2.6)
MCHC RBC-ENTMCNC: 32.8 PG — SIGNIFICANT CHANGE UP (ref 27–34)
MCHC RBC-ENTMCNC: 32.9 GM/DL — SIGNIFICANT CHANGE UP (ref 32–36)
MCV RBC AUTO: 99.7 FL — SIGNIFICANT CHANGE UP (ref 80–100)
NRBC # BLD: 0 /100 WBCS — SIGNIFICANT CHANGE UP (ref 0–0)
PHOSPHATE SERPL-MCNC: 2.1 MG/DL — LOW (ref 2.5–4.5)
PLATELET # BLD AUTO: 88 K/UL — LOW (ref 150–400)
POTASSIUM SERPL-MCNC: 3.8 MMOL/L — SIGNIFICANT CHANGE UP (ref 3.5–5.3)
POTASSIUM SERPL-SCNC: 3.8 MMOL/L — SIGNIFICANT CHANGE UP (ref 3.5–5.3)
RBC # BLD: 3.54 M/UL — LOW (ref 3.8–5.2)
RBC # FLD: 12.2 % — SIGNIFICANT CHANGE UP (ref 10.3–14.5)
SODIUM SERPL-SCNC: 137 MMOL/L — SIGNIFICANT CHANGE UP (ref 135–145)
WBC # BLD: 3.9 K/UL — SIGNIFICANT CHANGE UP (ref 3.8–10.5)
WBC # FLD AUTO: 3.9 K/UL — SIGNIFICANT CHANGE UP (ref 3.8–10.5)

## 2023-06-05 PROCEDURE — 99233 SBSQ HOSP IP/OBS HIGH 50: CPT

## 2023-06-05 RX ORDER — SODIUM,POTASSIUM PHOSPHATES 278-250MG
1 POWDER IN PACKET (EA) ORAL
Refills: 0 | Status: DISCONTINUED | OUTPATIENT
Start: 2023-06-05 | End: 2023-06-06

## 2023-06-05 RX ORDER — SODIUM,POTASSIUM PHOSPHATES 278-250MG
1 POWDER IN PACKET (EA) ORAL
Refills: 0 | Status: DISCONTINUED | OUTPATIENT
Start: 2023-06-05 | End: 2023-06-05

## 2023-06-05 RX ADMIN — Medication 100 MILLIGRAM(S): at 12:05

## 2023-06-05 RX ADMIN — Medication 1 PACKET(S): at 19:00

## 2023-06-05 RX ADMIN — Medication 2 MILLIGRAM(S): at 15:36

## 2023-06-05 RX ADMIN — Medication 1 MILLIGRAM(S): at 12:05

## 2023-06-05 RX ADMIN — Medication 1 TABLET(S): at 12:05

## 2023-06-05 RX ADMIN — Medication 62.5 MILLIMOLE(S): at 19:04

## 2023-06-05 RX ADMIN — Medication 2 MILLIGRAM(S): at 19:00

## 2023-06-05 RX ADMIN — Medication 1 PACKET(S): at 22:41

## 2023-06-05 RX ADMIN — Medication 2 MILLIGRAM(S): at 12:05

## 2023-06-05 NOTE — DIETITIAN NUTRITION RISK NOTIFICATION - TREATMENT: THE FOLLOWING DIET HAS BEEN RECOMMENDED
Diet, Regular:   Supplement Feeding Modality:  Oral  Ensure Plus High Protein Cans or Servings Per Day:  1       Frequency:  Two Times a day (06-05-23 @ 10:40) [Pending Verification By Attending]  Diet, Regular (06-03-23 @ 16:51) [Active]

## 2023-06-05 NOTE — DIETITIAN INITIAL EVALUATION ADULT - ADD RECOMMEND
1. Recommend Ensure Plus High Protein 8oz PO BID (Provides 700kcal & 40grams of Protein) ENAHNCE   2. Honor patients daily food preferences as feasible with prescribed diet   3. Encourage PO intakes  5. Monitor daily PO intakes, GI tolerance, labs, weights, skin integrity, & BM regularity

## 2023-06-05 NOTE — PROGRESS NOTE ADULT - ASSESSMENT
47yo female with hx of chronic EtOH abuse, presented to the ED accompanied by her 2 sisters for detox    ETOH withdrawal   -BAC elevated on arrival  -CIWA Protocol w/ symptom triggered Ativan.  - IV ativan PRN moderate/severe tremors  -Thiamine/Folate/Multivitamin, NS @ 75  -SW consult for Detox  -Seizure precaution  -Aspiration precaution    Electrolyte Abnormalities  -replete as needed  -monitor labs    Hypernatremia - resolved  -resolved with IVF  -monitor BMP    Transaminitis  Hepatic Steatosis  -EtOH induced  -CT A/P revealed Steatosis with areas of more focal fatty infiltration  -Monitor    VTE ppx  Low risk    Will update family individually

## 2023-06-05 NOTE — DIETITIAN INITIAL EVALUATION ADULT - PERTINENT MEDS FT
MEDICATIONS  (STANDING):  folic acid 1 milliGRAM(s) Oral daily  multivitamin 1 Tablet(s) Oral daily  sodium chloride 0.9%. 1000 milliLiter(s) (75 mL/Hr) IV Continuous <Continuous>  thiamine 100 milliGRAM(s) Oral daily    MEDICATIONS  (PRN):  acetaminophen     Tablet .. 650 milliGRAM(s) Oral every 6 hours PRN Temp greater or equal to 38C (100.4F), Mild Pain (1 - 3)  LORazepam     Tablet 2 milliGRAM(s) Oral every 1 hour PRN Symptom-triggered: each CIWA -Ar score 8 or GREATER  LORazepam     Tablet 2 milliGRAM(s) Oral every 2 hours PRN Symptom-triggered 2 point increase in CIWA-Ar  ondansetron Injectable 4 milliGRAM(s) IV Push every 8 hours PRN Nausea and/or Vomiting

## 2023-06-05 NOTE — DIETITIAN INITIAL EVALUATION ADULT - ORAL INTAKE PTA/DIET HISTORY
Patient reports poor appetite prior to admission. No known food allergies/intolerances. Denies any recent weight changes. Reports UBW to be 110 lbs.

## 2023-06-05 NOTE — PROGRESS NOTE ADULT - SUBJECTIVE AND OBJECTIVE BOX
Patient is a 48y old  Female who presents with a chief complaint of Alcohol intoxication (04 Jun 2023 07:47)    Patient seen and examined at bedside.  no acute overnight events    ALLERGIES:  niacin (Unknown)  Aleve (Unknown)  penicillin (Unknown)        Vital Signs Last 24 Hrs  T(F): 99.5 (05 Jun 2023 05:58), Max: 99.5 (05 Jun 2023 05:58)  HR: 95 (05 Jun 2023 05:58) (91 - 95)  BP: 118/82 (05 Jun 2023 05:58) (118/82 - 132/92)  RR: 16 (05 Jun 2023 05:58) (14 - 16)  SpO2: 98% (05 Jun 2023 05:58) (98% - 99%)  I&O's Summary    04 Jun 2023 07:01  -  05 Jun 2023 07:00  --------------------------------------------------------  IN: 900 mL / OUT: 0 mL / NET: 900 mL      MEDICATIONS:  acetaminophen     Tablet .. 650 milliGRAM(s) Oral every 6 hours PRN  folic acid 1 milliGRAM(s) Oral daily  LORazepam     Tablet 2 milliGRAM(s) Oral every 1 hour PRN  LORazepam     Tablet 2 milliGRAM(s) Oral every 2 hours PRN  multivitamin 1 Tablet(s) Oral daily  ondansetron Injectable 4 milliGRAM(s) IV Push every 8 hours PRN  sodium chloride 0.9%. 1000 milliLiter(s) IV Continuous <Continuous>  thiamine 100 milliGRAM(s) Oral daily      PHYSICAL EXAM:  General: NAD, A/O x 3  ENT: MMM, no thrush  Neck: Supple, No JVD  Lungs: Clear to auscultation bilaterally, non labored, good air entry  Cardio: RRR, S1/S2, No murmurs  Abdomen: Soft, Nontender, Nondistended; Bowel sounds present  Extremities: No cyanosis, No edema    LABS:                        11.6   3.90  )-----------( 88       ( 05 Jun 2023 06:34 )             35.3     06-05    137  |  102  |  4   ----------------------------<  73  3.8   |  21  |  0.41    Ca    7.3      05 Jun 2023 06:34  Phos  2.1     06-05  Mg     1.9     06-05    TPro  6.0  /  Alb  3.0  /  TBili  0.5  /  DBili  x   /  AST  153  /  ALT  73  /  AlkPhos  64  06-04                                        RADIOLOGY & ADDITIONAL TESTS:    Care Discussed with Consultants/Other Providers:

## 2023-06-05 NOTE — DIETITIAN INITIAL EVALUATION ADULT - OTHER INFO
Patient reports poor appetite at this time. Encouraged PO intakes at breakfast. Denies any chewing/swallowing difficulties. Patient continues w/ MVI, thiamine, & folic acid supplements 2/2 EtOH abuse. Skin intact. No edema noted. Patient reports last BM to be this morning (6/5/23). NFPE indicated moderate protein calorie malnutrition as evidenced by muscle and subcutaneous fat loss. Recommend Ensure Plus High Protein 8oz PO BID (Provides 700kcal & 40grams of Protein) to enhance PO intakes and optimize nutritional status.

## 2023-06-05 NOTE — PROGRESS NOTE ADULT - NS ATTEND AMEND GEN_ALL_CORE FT
49 y/o F with hx of chronic EtOH abuse, presented to the ED accompanied by her 2 sisters, admitted for EtOH withdrawal, consideration for detox/rehab. cont CIWA, IVF, b12/folate/mvt. replete electrolytes as indicated. SW consult - patient agreeable to rehab. anticipate dispo 24-48h

## 2023-06-05 NOTE — DIETITIAN INITIAL EVALUATION ADULT - PERTINENT LABORATORY DATA
06-05    137  |  102  |  4<L>  ----------------------------<  73  3.8   |  21<L>  |  0.41<L>    Ca    7.3<L>      05 Jun 2023 06:34  Phos  2.1     06-05  Mg     1.9     06-05    TPro  6.0  /  Alb  3.0<L>  /  TBili  0.5  /  DBili  x   /  AST  153<H>  /  ALT  73<H>  /  AlkPhos  64  06-04

## 2023-06-06 LAB
ALBUMIN SERPL ELPH-MCNC: 3 G/DL — LOW (ref 3.3–5)
ALP SERPL-CCNC: 73 U/L — SIGNIFICANT CHANGE UP (ref 40–120)
ALT FLD-CCNC: 53 U/L — HIGH (ref 10–45)
ANION GAP SERPL CALC-SCNC: 10 MMOL/L — SIGNIFICANT CHANGE UP (ref 5–17)
AST SERPL-CCNC: 72 U/L — HIGH (ref 10–40)
BILIRUB SERPL-MCNC: 0.7 MG/DL — SIGNIFICANT CHANGE UP (ref 0.2–1.2)
BUN SERPL-MCNC: 3 MG/DL — LOW (ref 7–23)
CALCIUM SERPL-MCNC: 8 MG/DL — LOW (ref 8.4–10.5)
CHLORIDE SERPL-SCNC: 103 MMOL/L — SIGNIFICANT CHANGE UP (ref 96–108)
CO2 SERPL-SCNC: 25 MMOL/L — SIGNIFICANT CHANGE UP (ref 22–31)
CREAT SERPL-MCNC: 0.55 MG/DL — SIGNIFICANT CHANGE UP (ref 0.5–1.3)
EGFR: 112 ML/MIN/1.73M2 — SIGNIFICANT CHANGE UP
GLUCOSE SERPL-MCNC: 92 MG/DL — SIGNIFICANT CHANGE UP (ref 70–99)
HCT VFR BLD CALC: 36.6 % — SIGNIFICANT CHANGE UP (ref 34.5–45)
HGB BLD-MCNC: 12.2 G/DL — SIGNIFICANT CHANGE UP (ref 11.5–15.5)
MAGNESIUM SERPL-MCNC: 1.6 MG/DL — SIGNIFICANT CHANGE UP (ref 1.6–2.6)
MCHC RBC-ENTMCNC: 33 PG — SIGNIFICANT CHANGE UP (ref 27–34)
MCHC RBC-ENTMCNC: 33.3 GM/DL — SIGNIFICANT CHANGE UP (ref 32–36)
MCV RBC AUTO: 98.9 FL — SIGNIFICANT CHANGE UP (ref 80–100)
NRBC # BLD: 0 /100 WBCS — SIGNIFICANT CHANGE UP (ref 0–0)
PHOSPHATE SERPL-MCNC: 3.3 MG/DL — SIGNIFICANT CHANGE UP (ref 2.5–4.5)
PLATELET # BLD AUTO: 116 K/UL — LOW (ref 150–400)
POTASSIUM SERPL-MCNC: 3.4 MMOL/L — LOW (ref 3.5–5.3)
POTASSIUM SERPL-SCNC: 3.4 MMOL/L — LOW (ref 3.5–5.3)
PROT SERPL-MCNC: 6.3 G/DL — SIGNIFICANT CHANGE UP (ref 6–8.3)
RBC # BLD: 3.7 M/UL — LOW (ref 3.8–5.2)
RBC # FLD: 12 % — SIGNIFICANT CHANGE UP (ref 10.3–14.5)
SODIUM SERPL-SCNC: 138 MMOL/L — SIGNIFICANT CHANGE UP (ref 135–145)
WBC # BLD: 3.23 K/UL — LOW (ref 3.8–10.5)
WBC # FLD AUTO: 3.23 K/UL — LOW (ref 3.8–10.5)

## 2023-06-06 PROCEDURE — 99233 SBSQ HOSP IP/OBS HIGH 50: CPT

## 2023-06-06 RX ORDER — MAGNESIUM SULFATE 500 MG/ML
1 VIAL (ML) INJECTION ONCE
Refills: 0 | Status: COMPLETED | OUTPATIENT
Start: 2023-06-06 | End: 2023-06-06

## 2023-06-06 RX ORDER — POTASSIUM CHLORIDE 20 MEQ
40 PACKET (EA) ORAL ONCE
Refills: 0 | Status: COMPLETED | OUTPATIENT
Start: 2023-06-06 | End: 2023-06-06

## 2023-06-06 RX ADMIN — Medication 100 MILLIGRAM(S): at 10:53

## 2023-06-06 RX ADMIN — Medication 1 PACKET(S): at 08:59

## 2023-06-06 RX ADMIN — Medication 1 TABLET(S): at 10:53

## 2023-06-06 RX ADMIN — Medication 40 MILLIEQUIVALENT(S): at 10:54

## 2023-06-06 RX ADMIN — Medication 2 MILLIGRAM(S): at 09:00

## 2023-06-06 RX ADMIN — Medication 100 GRAM(S): at 10:54

## 2023-06-06 RX ADMIN — Medication 1 MILLIGRAM(S): at 10:53

## 2023-06-06 NOTE — PROGRESS NOTE ADULT - SUBJECTIVE AND OBJECTIVE BOX
Patient is a 48y old  Female who presents with a chief complaint of Alcohol use with intoxication    Patient seen and examined at bedside.  no acute overnight events    ALLERGIES:  niacin (Unknown)  Aleve (Unknown)  penicillin (Unknown)        Vital Signs Last 24 Hrs  T(F): 98.5 (06 Jun 2023 06:00), Max: 99.1 (05 Jun 2023 15:23)  HR: 101 (06 Jun 2023 06:00) (83 - 101)  BP: 125/85 (06 Jun 2023 06:00) (125/85 - 139/92)  RR: 18 (06 Jun 2023 06:00) (17 - 18)  SpO2: 98% (06 Jun 2023 06:00) (96% - 98%)  I&O's Summary    MEDICATIONS:  acetaminophen     Tablet .. 650 milliGRAM(s) Oral every 6 hours PRN  folic acid 1 milliGRAM(s) Oral daily  LORazepam     Tablet 2 milliGRAM(s) Oral every 2 hours PRN  LORazepam     Tablet 2 milliGRAM(s) Oral every 1 hour PRN  magnesium sulfate  IVPB 1 Gram(s) IV Intermittent once  multivitamin 1 Tablet(s) Oral daily  ondansetron Injectable 4 milliGRAM(s) IV Push every 8 hours PRN  potassium chloride   Powder 40 milliEquivalent(s) Oral once  potassium phosphate / sodium phosphate Powder (PHOS-NaK) 1 Packet(s) Oral four times a day with meals  thiamine 100 milliGRAM(s) Oral daily      PHYSICAL EXAM:  General: NAD, A/O x 3  ENT: MMM, no thrush  Neck: Supple, No JVD  Lungs: Clear to auscultation bilaterally, non labored, good air entry  Cardio: RRR, S1/S2, No murmurs  Abdomen: Soft, Nontender, Nondistended; Bowel sounds present  Extremities: No cyanosis, No edema    LABS:                        12.2   3.23  )-----------( 116      ( 06 Jun 2023 05:43 )             36.6     06-06    138  |  103  |  3   ----------------------------<  92  3.4   |  25  |  0.55    Ca    8.0      06 Jun 2023 05:43  Phos  3.3     06-06  Mg     1.6     06-06    TPro  6.3  /  Alb  3.0  /  TBili  0.7  /  DBili  x   /  AST  72  /  ALT  53  /  AlkPhos  73  06-06                                        RADIOLOGY & ADDITIONAL TESTS:    Care Discussed with Consultants/Other Providers:

## 2023-06-06 NOTE — PROGRESS NOTE ADULT - NS ATTEND AMEND GEN_ALL_CORE FT
49 y/o F with hx of chronic EtOH abuse, presented to the ED accompanied by her 2 sisters, admitted for EtOH withdrawal, consideration for detox/rehab. cont CIWA, b12/folate/mvt. replete electrolytes as indicated. SW consult - patient agreeable to rehab. anticipate dispo 24-48h, awaiting placement

## 2023-06-06 NOTE — PROGRESS NOTE ADULT - ASSESSMENT
47yo female with hx of chronic EtOH abuse, presented to the ED accompanied by her 2 sisters for detox    ETOH withdrawal   -BAC elevated on arrival  -Currently on symptom triggered Ativan  -Thiamine/Folate/Multivitamin, NS @ 75  -Pt now agreeable to inpatient detox, SW following  -Maintain aspiration and Seizure precautions    Electrolyte Abnormalities  -replete as needed  -monitor labs    Hypernatremia - resolved  -resolved with IVF  -monitor BMP    Transaminitis  Hepatic Steatosis  -EtOH induced  -CT A/P revealed Steatosis with areas of more focal fatty infiltration  -Monitor    VTE ppx  Low risk    Will update family individually  47yo female with hx of chronic EtOH abuse, presented to the ED accompanied by her 2 sisters for detox    ETOH withdrawal   -BAC elevated on arrival  -Currently on symptom triggered Ativan  -Thiamine/Folate/Multivitamin, stopped IVF  -Pt now agreeable to inpatient detox, SW following  -Maintain aspiration and Seizure precautions    Thrombocytopenia  -likely related to chronic alcohol abuse  -monitor CBC    Electrolyte Abnormalities  -replete as needed  -monitor labs    Hypernatremia - resolved  -resolved with IVF  -monitor BMP    Transaminitis  Hepatic Steatosis  -EtOH induced  -CT A/P revealed Steatosis with areas of more focal fatty infiltration  -Monitor    VTE ppx  Low risk    Will update family individually

## 2023-06-07 LAB
ANION GAP SERPL CALC-SCNC: 14 MMOL/L — SIGNIFICANT CHANGE UP (ref 5–17)
APPEARANCE UR: ABNORMAL
BACTERIA # UR AUTO: ABNORMAL /HPF
BILIRUB UR-MCNC: NEGATIVE — SIGNIFICANT CHANGE UP
BUN SERPL-MCNC: 9 MG/DL — SIGNIFICANT CHANGE UP (ref 7–23)
CALCIUM SERPL-MCNC: 8.7 MG/DL — SIGNIFICANT CHANGE UP (ref 8.4–10.5)
CHLORIDE SERPL-SCNC: 100 MMOL/L — SIGNIFICANT CHANGE UP (ref 96–108)
CO2 SERPL-SCNC: 24 MMOL/L — SIGNIFICANT CHANGE UP (ref 22–31)
COLOR SPEC: SIGNIFICANT CHANGE UP
CREAT SERPL-MCNC: 0.72 MG/DL — SIGNIFICANT CHANGE UP (ref 0.5–1.3)
DIFF PNL FLD: ABNORMAL
EGFR: 102 ML/MIN/1.73M2 — SIGNIFICANT CHANGE UP
EPI CELLS # UR: 5 — SIGNIFICANT CHANGE UP
GLUCOSE SERPL-MCNC: 107 MG/DL — HIGH (ref 70–99)
GLUCOSE UR QL: NEGATIVE MG/DL — SIGNIFICANT CHANGE UP
HCT VFR BLD CALC: 42.4 % — SIGNIFICANT CHANGE UP (ref 34.5–45)
HGB BLD-MCNC: 14.1 G/DL — SIGNIFICANT CHANGE UP (ref 11.5–15.5)
KETONES UR-MCNC: >=160 MG/DL
LEUKOCYTE ESTERASE UR-ACNC: ABNORMAL
MAGNESIUM SERPL-MCNC: 1.9 MG/DL — SIGNIFICANT CHANGE UP (ref 1.6–2.6)
MCHC RBC-ENTMCNC: 33 PG — SIGNIFICANT CHANGE UP (ref 27–34)
MCHC RBC-ENTMCNC: 33.3 GM/DL — SIGNIFICANT CHANGE UP (ref 32–36)
MCV RBC AUTO: 99.3 FL — SIGNIFICANT CHANGE UP (ref 80–100)
NITRITE UR-MCNC: NEGATIVE — SIGNIFICANT CHANGE UP
NRBC # BLD: 0 /100 WBCS — SIGNIFICANT CHANGE UP (ref 0–0)
PH UR: 6 — SIGNIFICANT CHANGE UP (ref 5–8)
PLATELET # BLD AUTO: 189 K/UL — SIGNIFICANT CHANGE UP (ref 150–400)
POTASSIUM SERPL-MCNC: 3.5 MMOL/L — SIGNIFICANT CHANGE UP (ref 3.5–5.3)
POTASSIUM SERPL-SCNC: 3.5 MMOL/L — SIGNIFICANT CHANGE UP (ref 3.5–5.3)
PROT UR-MCNC: 30 MG/DL
RBC # BLD: 4.27 M/UL — SIGNIFICANT CHANGE UP (ref 3.8–5.2)
RBC # FLD: 12.3 % — SIGNIFICANT CHANGE UP (ref 10.3–14.5)
RBC CASTS # UR COMP ASSIST: 1 /HPF — SIGNIFICANT CHANGE UP (ref 0–4)
SODIUM SERPL-SCNC: 138 MMOL/L — SIGNIFICANT CHANGE UP (ref 135–145)
SP GR SPEC: 1.02 — SIGNIFICANT CHANGE UP (ref 1–1.03)
UROBILINOGEN FLD QL: 1 MG/DL — SIGNIFICANT CHANGE UP (ref 0.2–1)
WBC # BLD: 5.54 K/UL — SIGNIFICANT CHANGE UP (ref 3.8–10.5)
WBC # FLD AUTO: 5.54 K/UL — SIGNIFICANT CHANGE UP (ref 3.8–10.5)
WBC UR QL: 1 /HPF — SIGNIFICANT CHANGE UP (ref 0–5)

## 2023-06-07 PROCEDURE — 99232 SBSQ HOSP IP/OBS MODERATE 35: CPT

## 2023-06-07 RX ADMIN — Medication 650 MILLIGRAM(S): at 07:48

## 2023-06-07 RX ADMIN — Medication 2 MILLIGRAM(S): at 09:23

## 2023-06-07 RX ADMIN — Medication 1 TABLET(S): at 11:54

## 2023-06-07 RX ADMIN — Medication 2 MILLIGRAM(S): at 11:54

## 2023-06-07 RX ADMIN — Medication 2 MILLIGRAM(S): at 15:27

## 2023-06-07 RX ADMIN — Medication 650 MILLIGRAM(S): at 08:18

## 2023-06-07 RX ADMIN — Medication 1 MILLIGRAM(S): at 11:55

## 2023-06-07 RX ADMIN — Medication 2 MILLIGRAM(S): at 19:37

## 2023-06-07 NOTE — PROGRESS NOTE ADULT - NS ATTEND AMEND GEN_ALL_CORE FT
47 y/o F with hx of chronic EtOH abuse, presented to the ED accompanied by her 2 sisters, admitted for EtOH withdrawal, consideration for detox/rehab. cont CIWA, b12/folate/mvt. replete electrolytes as indicated. SW consult - patient agreeable to rehab. anticipate dispo 24-48h, awaiting placement. 49 y/o F with hx of chronic EtOH abuse, presented to the ED accompanied by her 2 sisters, admitted for EtOH withdrawal, consideration for detox/rehab. cont CIWA, b12/folate/mvt. replete electrolytes as indicated. SW consult - patient agreeable to rehab. anticipate dispo 24-48h, awaiting placement. c/o dysuria - check UA

## 2023-06-07 NOTE — PROGRESS NOTE ADULT - SUBJECTIVE AND OBJECTIVE BOX
Patient is a 48y old  Female who presents with a chief complaint of Alcohol intoxication (06 Jun 2023 08:53)      Patient seen and examined at bedside. No overnight events reported. Patient states she is feeling anxious this morning and is tearful. Patient denies chest pain, sob, nausea, vomiting.     ALLERGIES:  niacin (Unknown)  Aleve (Unknown)  penicillin (Unknown)    MEDICATIONS  (STANDING):  folic acid 1 milliGRAM(s) Oral daily  multivitamin 1 Tablet(s) Oral daily    MEDICATIONS  (PRN):  acetaminophen     Tablet .. 650 milliGRAM(s) Oral every 6 hours PRN Temp greater or equal to 38C (100.4F), Mild Pain (1 - 3)  LORazepam     Tablet 2 milliGRAM(s) Oral every 1 hour PRN Symptom-triggered: each CIWA -Ar score 8 or GREATER  LORazepam     Tablet 2 milliGRAM(s) Oral every 2 hours PRN Symptom-triggered 2 point increase in CIWA-Ar  ondansetron Injectable 4 milliGRAM(s) IV Push every 8 hours PRN Nausea and/or Vomiting    Vital Signs Last 24 Hrs  T(F): 98.5 (07 Jun 2023 05:26), Max: 98.7 (06 Jun 2023 15:25)  HR: 92 (07 Jun 2023 05:26) (88 - 102)  BP: 112/84 (07 Jun 2023 05:26) (112/84 - 125/86)  RR: 16 (07 Jun 2023 05:26) (16 - 16)  SpO2: 97% (07 Jun 2023 05:26) (97% - 98%)  I&O's Summary    06 Jun 2023 07:01  -  07 Jun 2023 07:00  --------------------------------------------------------  IN: 200 mL / OUT: 0 mL / NET: 200 mL      PHYSICAL EXAM:  General: NAD, A/O x 3  ENT: No gross hearing impairment, Moist mucous membranes, no thrush  Neck: Supple, No JVD  Lungs: Clear to auscultation bilaterally, good air entry, non-labored breathing  Cardio: RRR, S1/S2, No murmur  Abdomen: Soft, Nontender, Nondistended; Bowel sounds present  Extremities: No calf tenderness, No cyanosis, No pitting edema    LABS:                        14.1   5.54  )-----------( 189      ( 07 Jun 2023 06:34 )             42.4     06-07    138  |  100  |  9   ----------------------------<  107  3.5   |  24  |  0.72    Ca    8.7      07 Jun 2023 06:34  Phos  3.3     06-06  Mg     1.9     06-07    TPro  6.3  /  Alb  3.0  /  TBili  0.7  /  DBili  x   /  AST  72  /  ALT  53  /  AlkPhos  73  06-06                                            RADIOLOGY & ADDITIONAL TESTS:    Care Discussed with Consultants/Other Providers:    Patient is a 48y old  Female who presents with a chief complaint of Alcohol intoxication (06 Jun 2023 08:53)      Patient seen and examined at bedside. No overnight events reported. Patient states she is feeling anxious this morning and is tearful. Patient denies chest pain, sob, nausea, vomiting. Now complaining of urinary frequency, and burning.     ALLERGIES:  niacin (Unknown)  Aleve (Unknown)  penicillin (Unknown)    MEDICATIONS  (STANDING):  folic acid 1 milliGRAM(s) Oral daily  multivitamin 1 Tablet(s) Oral daily    MEDICATIONS  (PRN):  acetaminophen     Tablet .. 650 milliGRAM(s) Oral every 6 hours PRN Temp greater or equal to 38C (100.4F), Mild Pain (1 - 3)  LORazepam     Tablet 2 milliGRAM(s) Oral every 1 hour PRN Symptom-triggered: each CIWA -Ar score 8 or GREATER  LORazepam     Tablet 2 milliGRAM(s) Oral every 2 hours PRN Symptom-triggered 2 point increase in CIWA-Ar  ondansetron Injectable 4 milliGRAM(s) IV Push every 8 hours PRN Nausea and/or Vomiting    Vital Signs Last 24 Hrs  T(F): 98.5 (07 Jun 2023 05:26), Max: 98.7 (06 Jun 2023 15:25)  HR: 92 (07 Jun 2023 05:26) (88 - 102)  BP: 112/84 (07 Jun 2023 05:26) (112/84 - 125/86)  RR: 16 (07 Jun 2023 05:26) (16 - 16)  SpO2: 97% (07 Jun 2023 05:26) (97% - 98%)  I&O's Summary    06 Jun 2023 07:01  -  07 Jun 2023 07:00  --------------------------------------------------------  IN: 200 mL / OUT: 0 mL / NET: 200 mL      PHYSICAL EXAM:  General: NAD, A/O x 3  ENT: No gross hearing impairment, Moist mucous membranes, no thrush  Neck: Supple, No JVD  Lungs: Clear to auscultation bilaterally, good air entry, non-labored breathing  Cardio: RRR, S1/S2, No murmur  Abdomen: Soft, Nontender, Nondistended; Bowel sounds present  Extremities: No calf tenderness, No cyanosis, No pitting edema    LABS:                        14.1   5.54  )-----------( 189      ( 07 Jun 2023 06:34 )             42.4     06-07    138  |  100  |  9   ----------------------------<  107  3.5   |  24  |  0.72    Ca    8.7      07 Jun 2023 06:34  Phos  3.3     06-06  Mg     1.9     06-07    TPro  6.3  /  Alb  3.0  /  TBili  0.7  /  DBili  x   /  AST  72  /  ALT  53  /  AlkPhos  73  06-06                                            RADIOLOGY & ADDITIONAL TESTS:    Care Discussed with Consultants/Other Providers:

## 2023-06-07 NOTE — PROGRESS NOTE ADULT - ASSESSMENT
49yo female with hx of chronic EtOH abuse, presented to the ED accompanied by her 2 sisters for detox    ETOH withdrawal   -BAC elevated on arrival  -Currently on symptom triggered Ativan  -Thiamine/Folate/Multivitamin, stopped IVF  -Pt now agreeable to inpatient detox, SW following  -Maintain aspiration and Seizure precautions    Thrombocytopenia-improved  -likely related to chronic alcohol abuse  -monitor CBC    Electrolyte Abnormalities  -replete as needed  -monitor labs    Hypernatremia - resolved  -resolved with IVF  -monitor BMP    Transaminitis  Hepatic Steatosis  -EtOH induced  -CT A/P revealed Steatosis with areas of more focal fatty infiltration  -Monitor    VTE ppx  Low risk    Will update family individually  49yo female with hx of chronic EtOH abuse, presented to the ED accompanied by her 2 sisters for detox    ETOH withdrawal   -BAC elevated on arrival  -Currently on symptom triggered Ativan  -Thiamine/Folate/Multivitamin, stopped IVF  -Pt now agreeable to inpatient detox, SW following  -Maintain aspiration and Seizure precautions    Thrombocytopenia-improved  -likely related to chronic alcohol abuse  -monitor CBC    Electrolyte Abnormalities  -replete as needed  -monitor labs    R/O UTI  -f/u UA    Hypernatremia - resolved  -resolved with IVF  -monitor BMP    Transaminitis  Hepatic Steatosis  -EtOH induced  -CT A/P revealed Steatosis with areas of more focal fatty infiltration  -Monitor    VTE ppx  Low risk    Will update family individually  47yo female with hx of chronic EtOH abuse, presented to the ED accompanied by her 2 sisters for detox    ETOH withdrawal   -BAC elevated on arrival  -Currently on symptom triggered Ativan  -Thiamine/Folate/Multivitamin, stopped IVF  -Pt now agreeable to inpatient detox, SW following  -Maintain aspiration and Seizure precautions    Thrombocytopenia-improved  -likely related to chronic alcohol abuse  -monitor CBC    Electrolyte Abnormalities  -replete as needed  -monitor labs    R/O UTI  -UA reviewed  -Patient symptomatic  -F/U urine cx and start po bactrim for 3 days    Hypernatremia - resolved  -resolved with IVF  -monitor BMP    Transaminitis  Hepatic Steatosis  -EtOH induced  -CT A/P revealed Steatosis with areas of more focal fatty infiltration  -Monitor    VTE ppx  Low risk    Will update family individually

## 2023-06-08 ENCOUNTER — TRANSCRIPTION ENCOUNTER (OUTPATIENT)
Age: 48
End: 2023-06-08

## 2023-06-08 LAB
ANION GAP SERPL CALC-SCNC: 7 MMOL/L — SIGNIFICANT CHANGE UP (ref 5–17)
BUN SERPL-MCNC: 10 MG/DL — SIGNIFICANT CHANGE UP (ref 7–23)
CALCIUM SERPL-MCNC: 8.4 MG/DL — SIGNIFICANT CHANGE UP (ref 8.4–10.5)
CHLORIDE SERPL-SCNC: 102 MMOL/L — SIGNIFICANT CHANGE UP (ref 96–108)
CO2 SERPL-SCNC: 28 MMOL/L — SIGNIFICANT CHANGE UP (ref 22–31)
CREAT SERPL-MCNC: 0.51 MG/DL — SIGNIFICANT CHANGE UP (ref 0.5–1.3)
CULTURE RESULTS: SIGNIFICANT CHANGE UP
EGFR: 114 ML/MIN/1.73M2 — SIGNIFICANT CHANGE UP
GLUCOSE SERPL-MCNC: 73 MG/DL — SIGNIFICANT CHANGE UP (ref 70–99)
HCT VFR BLD CALC: 36.9 % — SIGNIFICANT CHANGE UP (ref 34.5–45)
HGB BLD-MCNC: 12.2 G/DL — SIGNIFICANT CHANGE UP (ref 11.5–15.5)
MCHC RBC-ENTMCNC: 33.1 GM/DL — SIGNIFICANT CHANGE UP (ref 32–36)
MCHC RBC-ENTMCNC: 33.1 PG — SIGNIFICANT CHANGE UP (ref 27–34)
MCV RBC AUTO: 100 FL — SIGNIFICANT CHANGE UP (ref 80–100)
NRBC # BLD: 0 /100 WBCS — SIGNIFICANT CHANGE UP (ref 0–0)
PLATELET # BLD AUTO: 194 K/UL — SIGNIFICANT CHANGE UP (ref 150–400)
POTASSIUM SERPL-MCNC: 3.6 MMOL/L — SIGNIFICANT CHANGE UP (ref 3.5–5.3)
POTASSIUM SERPL-SCNC: 3.6 MMOL/L — SIGNIFICANT CHANGE UP (ref 3.5–5.3)
RBC # BLD: 3.69 M/UL — LOW (ref 3.8–5.2)
RBC # FLD: 12.3 % — SIGNIFICANT CHANGE UP (ref 10.3–14.5)
SODIUM SERPL-SCNC: 137 MMOL/L — SIGNIFICANT CHANGE UP (ref 135–145)
SPECIMEN SOURCE: SIGNIFICANT CHANGE UP
WBC # BLD: 4.18 K/UL — SIGNIFICANT CHANGE UP (ref 3.8–10.5)
WBC # FLD AUTO: 4.18 K/UL — SIGNIFICANT CHANGE UP (ref 3.8–10.5)

## 2023-06-08 PROCEDURE — 99233 SBSQ HOSP IP/OBS HIGH 50: CPT

## 2023-06-08 RX ORDER — ESCITALOPRAM OXALATE 10 MG/1
10 TABLET, FILM COATED ORAL DAILY
Refills: 0 | Status: DISCONTINUED | OUTPATIENT
Start: 2023-06-08 | End: 2023-06-09

## 2023-06-08 RX ADMIN — Medication 2 MILLIGRAM(S): at 00:00

## 2023-06-08 RX ADMIN — Medication 1 TABLET(S): at 19:00

## 2023-06-08 RX ADMIN — Medication 1 TABLET(S): at 06:37

## 2023-06-08 RX ADMIN — Medication 650 MILLIGRAM(S): at 07:05

## 2023-06-08 RX ADMIN — Medication 50 MILLIGRAM(S): at 11:46

## 2023-06-08 RX ADMIN — Medication 2 MILLIGRAM(S): at 07:05

## 2023-06-08 RX ADMIN — Medication 1 TABLET(S): at 00:44

## 2023-06-08 RX ADMIN — ESCITALOPRAM OXALATE 10 MILLIGRAM(S): 10 TABLET, FILM COATED ORAL at 11:43

## 2023-06-08 RX ADMIN — Medication 1 TABLET(S): at 11:43

## 2023-06-08 RX ADMIN — Medication 650 MILLIGRAM(S): at 07:35

## 2023-06-08 RX ADMIN — Medication 1 MILLIGRAM(S): at 11:43

## 2023-06-08 NOTE — DISCHARGE NOTE PROVIDER - NSDCMRMEDTOKEN_GEN_ALL_CORE_FT
escitalopram 10 mg oral tablet: 1 tab(s) orally once a day  folic acid 1 mg oral tablet: 1 tab(s) orally once a day  Multiple Vitamins oral tablet: 1 tab(s) orally once a day

## 2023-06-08 NOTE — DISCHARGE NOTE PROVIDER - NSDCCPCAREPLAN_GEN_ALL_CORE_FT
PRINCIPAL DISCHARGE DIAGNOSIS  Diagnosis: Alcohol intoxication  Assessment and Plan of Treatment: You were admitted for alcohol withdrawal  You were treated with ativan for withdrawal symptoms  You were prescribed the following new medications: lexapro for anxiety   Follow up with medical team at rehab and then with your primary care doctor

## 2023-06-08 NOTE — DISCHARGE NOTE PROVIDER - DETAILS OF MALNUTRITION DIAGNOSIS/DIAGNOSES
This patient has been assessed with a concern for Malnutrition and was treated during this hospitalization for the following Nutrition diagnosis/diagnoses:     -  06/05/2023: Moderate protein-calorie malnutrition   -  06/05/2023: Underweight (BMI < 19)

## 2023-06-08 NOTE — DISCHARGE NOTE PROVIDER - CARE PROVIDER_API CALL
Madie Hardy  [facility]  [address] [place] [id number]  Phone: [phone]  Fax: [phone]  Follow Up Time:

## 2023-06-08 NOTE — DISCHARGE NOTE NURSING/CASE MANAGEMENT/SOCIAL WORK - PATIENT PORTAL LINK FT
You can access the FollowMyHealth Patient Portal offered by [facility] by registering at the following website: [email or web address]. By joining [facility]’s FollowMyHealth portal, you will also be able to view your health information using other applications (apps) compatible with our system.

## 2023-06-08 NOTE — DISCHARGE NOTE PROVIDER - HOSPITAL COURSE
Hospital Course  HPI:  49yo female with hx of chronic EtOH abuse, presented to the ED accompanied by her 2 sisters for detox. Pt states she has been drinking quarter bottle of tequila daily x 6months. She was in a program 15years ago and was sober until the past 1.5 years. She has gone through withdrawals in the past. Denies requiring admission into the ICU. Currently pt denies cp, palpitations, sob, abd pain, N/V, fever, chills.   (03 Jun 2023 17:12)    Upon admission, patient was started on CIWA protocol symptom triggered ativan, Thiamine/Folate/Multivitamin. Patient also complained of anxiety and psychiatry was consulted. Patient agrees to inpatient alcohol rehab facility.       You were admitted for alcohol withdrawal  You were treated with ativan for symptoms  You were prescribed the following new medications:    You will need to follow up with your primary care physician.    Source of Infection:  Antibiotic / Last Day:    Palliative Care / Advanced Care Planning  Code Status:  Patient/Family agreeable to Hospice/Palliative (Y/N)?  Summary of Goals of Care Conversation:    Discharging Provider:  CHARLIE Danielson  Contact Info: [phone] - Please call with any questions or concerns.    Outpatient Provider:     SNF Provider: Hospital Course  HPI:  49yo female with hx of chronic EtOH abuse, presented to the ED accompanied by her 2 sisters for detox. Pt states she has been drinking quarter bottle of tequila daily x 6months. She was in a program 15years ago and was sober until the past 1.5 years. She has gone through withdrawals in the past. Denies requiring admission into the ICU. Currently pt denies cp, palpitations, sob, abd pain, N/V, fever, chills.   (03 Jun 2023 17:12)    Upon admission, patient was started on CIWA protocol symptom triggered ativan, thiamine/folate/multivitamin. Symptoms improved and CIWA scores now 0. Required no ativan overnight.   She was started on lexapro for anxiety. Patient agrees to inpatient alcohol rehab facility - accepted to [facility]     Discharging Provider:  Lindsey Leong NP  Contact Info: [phone]- Please call with any questions or concerns.    Outpatient Provider: Dr Hardy - notified office   Hospital Course  HPI:  49yo female with hx of chronic EtOH abuse, presented to the ED accompanied by her 2 sisters for detox. Pt states she has been drinking quarter bottle of tequila daily x 6months. She was in a program 15years ago and was sober until the past 1.5 years. She has gone through withdrawals in the past. Denies requiring admission into the ICU. Currently pt denies cp, palpitations, sob, abd pain, N/V, fever, chills.   (03 Jun 2023 17:12)    Upon admission, patient was started on CIWA protocol symptom triggered ativan, thiamine/folate/multivitamin. Symptoms improved and CIWA scores now 0. Required no ativan overnight.   She was started on lexapro for anxiety. Patient agrees to inpatient alcohol rehab facility - accepted to [facility] in [place]. patient is no longer actively withdrawing and is off all benzos     Discharging Provider:  Lindsey Leong NP  Contact Info: [phone]- Please call with any questions or concerns.    Outpatient Provider: Dr Hardy - notified office

## 2023-06-08 NOTE — PROGRESS NOTE ADULT - SUBJECTIVE AND OBJECTIVE BOX
Patient is a 48y old  Female who presents with a chief complaint of Alcohol intoxication (2023 11:08)      Patient seen and examined at bedside. No overnight events reported. Patient states she did not sleep well last night and is anxious. She also states her urinary symptoms are improving. Denies chest pain, sob, nausea, vomiting.     ALLERGIES:  niacin (Unknown)  Aleve (Unknown)  penicillin (Unknown)    MEDICATIONS  (STANDING):  chlordiazePOXIDE   Oral   chlordiazePOXIDE 50 milliGRAM(s) Oral every 6 hours  escitalopram 10 milliGRAM(s) Oral daily  folic acid 1 milliGRAM(s) Oral daily  multivitamin 1 Tablet(s) Oral daily  trimethoprim  160 mG/sulfamethoxazole 800 mG 1 Tablet(s) Oral two times a day    MEDICATIONS  (PRN):  acetaminophen     Tablet .. 650 milliGRAM(s) Oral every 6 hours PRN Temp greater or equal to 38C (100.4F), Mild Pain (1 - 3)  ondansetron Injectable 4 milliGRAM(s) IV Push every 8 hours PRN Nausea and/or Vomiting    Vital Signs Last 24 Hrs  T(F): 98.4 (2023 05:37), Max: 98.8 (2023 22:45)  HR: 97 (2023 05:37) (92 - 108)  BP: 110/76 (2023 05:37) (110/76 - 137/99)  RR: 18 (2023 05:37) (18 - 20)  SpO2: 97% (2023 05:37) (97% - 99%)  I&O's Summary    PHYSICAL EXAM:  General: NAD, A/O x 3  ENT: No gross hearing impairment, Moist mucous membranes, no thrush  Neck: Supple, No JVD  Lungs: Clear to auscultation bilaterally, good air entry, non-labored breathing  Cardio: RRR, S1/S2, No murmur  Abdomen: Soft, Nontender, Nondistended; Bowel sounds present  Extremities: No calf tenderness, No cyanosis, No pitting edema    LABS:                        12.2   4.18  )-----------( 194      ( 2023 06:36 )             36.9     06-08    137  |  102  |  10  ----------------------------<  73  3.6   |  28  |  0.51    Ca    8.4      2023 06:36  Phos  3.3     06-06  Mg     1.9     06-07    TPro  6.3  /  Alb  3.0  /  TBili  0.7  /  DBili  x   /  AST  72  /  ALT  53  /  AlkPhos  73  06-06                                      Urinalysis Basic - ( 2023 12:50 )    Color: Dark Yellow / Appearance: Slightly Cloudy / S.020 / pH: x  Gluc: x / Ketone: >=160 mg/dL  / Bili: Negative / Urobili: 1.0 mg/dL   Blood: x / Protein: 30 mg/dL / Nitrite: Negative   Leuk Esterase: Trace / RBC: 1 /HPF / WBC 1 /HPF   Sq Epi: x / Non Sq Epi: x / Bacteria: Few /HPF          RADIOLOGY & ADDITIONAL TESTS:    Care Discussed with Consultants/Other Providers:

## 2023-06-08 NOTE — PROGRESS NOTE ADULT - ASSESSMENT
49yo female with hx of chronic EtOH abuse, presented to the ED accompanied by her 2 sisters for detox    ETOH withdrawal   -BAC elevated on arrival  -Was on symptom triggered Ativan, however patient requiring frequent doses as well as CIWA of 6, switched to Librium taper  -Thiamine/Folate/Multivitamin, stopped IVF  -Pt now agreeable to inpatient detox, SW following  -Maintain aspiration and Seizure precautions  -Due to increased anxiety will get psych consult     Thrombocytopenia-improved  -likely related to chronic alcohol abuse  -monitor CBC    Electrolyte Abnormalities  -replete as needed  -monitor labs    R/O UTI  -UA reviewed  -Patient symptomatic, but improving this am  -F/U urine cx and continue po bactrim for total of 3 days    Hypernatremia - resolved  -resolved with IVF  -monitor BMP    Transaminitis  Hepatic Steatosis  -EtOH induced  -CT A/P revealed Steatosis with areas of more focal fatty infiltration  -Monitor    VTE ppx  Low risk    Will update family individually

## 2023-06-08 NOTE — PROGRESS NOTE ADULT - ATTENDING COMMENTS
ETOH withdrawal  - CIWA score 7, with the bigger component from anxiety. minimal tremors. No diaphoresis, palpitations, nausea/vomiting  - pt does not want to be on librium taper protocol and would like to cont on Ativan symptoms triggered protocol   - start Lexapro   - psych consult  - pt accepted to substance rehab facility in [place]   - [facility] if there is minimal requirement of ativan overnight.

## 2023-06-09 VITALS
DIASTOLIC BLOOD PRESSURE: 63 MMHG | TEMPERATURE: 98 F | OXYGEN SATURATION: 98 % | RESPIRATION RATE: 16 BRPM | SYSTOLIC BLOOD PRESSURE: 101 MMHG | HEART RATE: 91 BPM

## 2023-06-09 PROCEDURE — 36415 COLL VENOUS BLD VENIPUNCTURE: CPT

## 2023-06-09 PROCEDURE — 80307 DRUG TEST PRSMV CHEM ANLYZR: CPT

## 2023-06-09 PROCEDURE — 99285 EMERGENCY DEPT VISIT HI MDM: CPT | Mod: 25

## 2023-06-09 PROCEDURE — 80053 COMPREHEN METABOLIC PANEL: CPT

## 2023-06-09 PROCEDURE — 93970 EXTREMITY STUDY: CPT

## 2023-06-09 PROCEDURE — 85025 COMPLETE CBC W/AUTO DIFF WBC: CPT

## 2023-06-09 PROCEDURE — 83735 ASSAY OF MAGNESIUM: CPT

## 2023-06-09 PROCEDURE — 80048 BASIC METABOLIC PNL TOTAL CA: CPT

## 2023-06-09 PROCEDURE — 81001 URINALYSIS AUTO W/SCOPE: CPT

## 2023-06-09 PROCEDURE — 82746 ASSAY OF FOLIC ACID SERUM: CPT

## 2023-06-09 PROCEDURE — 87086 URINE CULTURE/COLONY COUNT: CPT

## 2023-06-09 PROCEDURE — 93005 ELECTROCARDIOGRAM TRACING: CPT

## 2023-06-09 PROCEDURE — 82607 VITAMIN B-12: CPT

## 2023-06-09 PROCEDURE — 85027 COMPLETE CBC AUTOMATED: CPT

## 2023-06-09 PROCEDURE — 84100 ASSAY OF PHOSPHORUS: CPT

## 2023-06-09 PROCEDURE — 99239 HOSP IP/OBS DSCHRG MGMT >30: CPT

## 2023-06-09 RX ORDER — ESCITALOPRAM OXALATE 10 MG/1
1 TABLET, FILM COATED ORAL
Qty: 30 | Refills: 0
Start: 2023-06-09 | End: 2023-07-08

## 2023-06-09 RX ORDER — FOLIC ACID 0.8 MG
1 TABLET ORAL
Qty: 0 | Refills: 0 | DISCHARGE
Start: 2023-06-09

## 2023-06-09 RX ADMIN — Medication 1 TABLET(S): at 06:58

## 2023-06-09 NOTE — PROGRESS NOTE ADULT - NUTRITIONAL ASSESSMENT
This patient has been assessed with a concern for Malnutrition and has been determined to have a diagnosis/diagnoses of Moderate protein-calorie malnutrition and Underweight (BMI < 19).    This patient is being managed with:   Diet Regular-  Supplement Feeding Modality:  Oral  Ensure Plus High Protein Cans or Servings Per Day:  1       Frequency:  Two Times a day  Entered: Jun 5 2023 10:40AM  
This patient has been assessed with a concern for Malnutrition and has been determined to have a diagnosis/diagnoses of Moderate protein-calorie malnutrition and Underweight (BMI < 19).    This patient is being managed with:   Diet Regular-  Supplement Feeding Modality:  Oral  Ensure Plus High Protein Cans or Servings Per Day:  1       Frequency:  Two Times a day  Entered: Jun 5 2023 10:40AM    Diet Regular-  Entered: Krish  3 2023  4:43PM    The following pending diet order is being considered for treatment of Moderate protein-calorie malnutrition and Underweight (BMI < 19):null
This patient has been assessed with a concern for Malnutrition and has been determined to have a diagnosis/diagnoses of Moderate protein-calorie malnutrition and Underweight (BMI < 19).    The following pending diet order is being considered for treatment of Moderate protein-calorie malnutrition and Underweight (BMI < 19):  Diet Regular-  Supplement Feeding Modality:  Oral  Ensure Plus High Protein Cans or Servings Per Day:  1       Frequency:  Two Times a day  Entered: Jun 5 2023 10:40AM  
This patient has been assessed with a concern for Malnutrition and has been determined to have a diagnosis/diagnoses of Moderate protein-calorie malnutrition and Underweight (BMI < 19).    This patient is being managed with:   Diet Regular-  Supplement Feeding Modality:  Oral  Ensure Plus High Protein Cans or Servings Per Day:  1       Frequency:  Two Times a day  Entered: Jun 5 2023 10:40AM    Diet Regular-  Entered: Krish  3 2023  4:43PM    The following pending diet order is being considered for treatment of Moderate protein-calorie malnutrition and Underweight (BMI < 19):null

## 2023-06-09 NOTE — PROGRESS NOTE ADULT - NS ATTEND AMEND GEN_ALL_CORE FT
ETOH withdrawal - resolved  - CIWA score: 0  - no need for librium taper protocol  - pt also has not required any additional doses of ativan since yesterday AM  - DC to substance rehab today    Anxiety  - started on Lexapro   - psych as outpt

## 2023-06-09 NOTE — PROGRESS NOTE ADULT - SUBJECTIVE AND OBJECTIVE BOX
Patient is a 48y old  Female who presents with a chief complaint of Alcohol intoxication (2023 08:16)    Patient seen and examined at bedside. No overnight events reported. Patient c/o mild anxiety which she attributes to overall situation     ALLERGIES:  niacin (Unknown)  Aleve (Unknown)  penicillin (Unknown)    MEDICATIONS  (STANDING):  escitalopram 10 milliGRAM(s) Oral daily  folic acid 1 milliGRAM(s) Oral daily  multivitamin 1 Tablet(s) Oral daily  trimethoprim  160 mG/sulfamethoxazole 800 mG 1 Tablet(s) Oral two times a day    MEDICATIONS  (PRN):  acetaminophen     Tablet .. 650 milliGRAM(s) Oral every 6 hours PRN Temp greater or equal to 38C (100.4F), Mild Pain (1 - 3)  LORazepam   Injectable 2 milliGRAM(s) IV Push every 4 hours PRN CIWA-Ar score 8 or greater  ondansetron Injectable 4 milliGRAM(s) IV Push every 8 hours PRN Nausea and/or Vomiting    Vital Signs Last 24 Hrs  T(F): 98 (2023 06:00), Max: 98.1 (2023 16:06)  HR: 91 (2023 06:00) (75 - 91)  BP: 101/63 (2023 06:00) (101/63 - 105/75)  RR: 16 (2023 06:00) (15 - 16)  SpO2: 98% (2023 06:00) (97% - 98%)    I&O's Summary  2023 07:01  -  2023 07:00  --------------------------------------------------------  IN: 540 mL / OUT: 0 mL / NET: 540 mL    PHYSICAL EXAM:  General: NAD, A/O x 3  ENT: No gross hearing impairment, Moist mucous membranes, no thrush  Neck: Supple, No JVD  Lungs: Clear to auscultation bilaterally, good air entry, non-labored breathing  Cardio: RRR, S1/S2, No murmur  Abdomen: Soft, Nontender, Nondistended; Bowel sounds present  Extremities: No calf tenderness, No cyanosis, No pitting edema  Psych: Appropriate mood and affect    LABS:                        12.2   4.18  )-----------( 194      ( 2023 06:36 )             36.9     06-08    137  |  102  |  10  ----------------------------<  73  3.6   |  28  |  0.51    Ca    8.4      2023 06:36  Mg     1.9     06-07    Urinalysis Basic - ( 2023 12:50 )    Color: Dark Yellow / Appearance: Slightly Cloudy / S.020 / pH: x  Gluc: x / Ketone: >=160 mg/dL  / Bili: Negative / Urobili: 1.0 mg/dL   Blood: x / Protein: 30 mg/dL / Nitrite: Negative   Leuk Esterase: Trace / RBC: 1 /HPF / WBC 1 /HPF   Sq Epi: x / Non Sq Epi: x / Bacteria: Few /HPF    Culture - Urine (collected 2023 18:40)  Source: Clean Catch Clean Catch (Midstream)  Final Report (2023 23:11):    <10,000 CFU/mL Normal Urogenital [place]        RADIOLOGY & ADDITIONAL TESTS:    Care Discussed with Consultants/Other Providers:

## 2023-06-09 NOTE — PROGRESS NOTE ADULT - ASSESSMENT
47yo female with hx of chronic EtOH abuse, presented to the ED accompanied by her 2 sisters for detox    #ETOH withdrawal  - CIWA score 7, with the bigger component from anxiety. minimal tremors. No diaphoresis, palpitations, nausea/vomiting  - Pt does not want to be on librium taper protocol and would like to cont on Ativan symptoms triggered protocol   - Started Lexapro   - Psych consult pending  - Pt accepted to substance rehab facility in [place]   - DC tomorrow if there is minimal requirement of ativan overnight.     #Thrombocytopenia-improved  - Likely related to chronic alcohol abuse  - Monitor CBC    #Electrolyte Abnormalities  - Replete as needed  - Monitor labs    R/O UTI  -UA reviewed  -Patient symptomatic, but improving this am  -F/U urine cx and continue po bactrim for total of 3 days    #Transaminitis  #Hepatic Steatosis  - EtOH induced  - CT A/P revealed Steatosis with areas of more focal fatty infiltration  - Monitor    #VTE ppx  - Low risk    Dispo: DC to substance use rehab once medically cleared     Patient will update her family 49yo female with hx of chronic EtOH abuse, presented to the ED accompanied by her 2 sisters for detox    #ETOH withdrawal  - Anxiety improving. No diaphoresis, palpitations, nausea/vomiting  - Pt does not want to be on librium taper protocol and would like to cont on Ativan symptoms triggered protocol   - Started Lexapro   - Psych consult pending  - Pt accepted to substance rehab facility in [place]     #Thrombocytopenia-improved  - Likely related to chronic alcohol abuse  - Monitor CBC    #Electrolyte Abnormalities  - Replete as needed  - Monitor labs    #UTI  - UA reviewed  - Patient symptomatic, but improved  - Urine culture negative   - DC bactrim     #Transaminitis  #Hepatic Steatosis  - EtOH induced  - CT A/P revealed Steatosis with areas of more focal fatty infiltration  - Monitor    #VTE ppx  - Low risk    Dispo: DC to substance use rehab today, family will take patient directly there     Patient will update her family 47yo female with hx of chronic EtOH abuse, presented to the ED accompanied by her 2 sisters for detox    #ETOH withdrawal  - Anxiety improving. No diaphoresis, palpitations, nausea/vomiting  - Pt does not want to be on librium taper protocol and would like to cont on Ativan symptoms triggered protocol. Did not require any ativan overnight   - Started Lexapro   - Pt accepted to substance rehab facility in [place]     #Thrombocytopenia-improved  - Likely related to chronic alcohol abuse  - Monitor CBC    #Electrolyte Abnormalities  - Replete as needed  - Monitor labs    #UTI  - UA reviewed  - Patient symptomatic, but improved  - Urine culture negative   - DC bactrim     #Transaminitis  #Hepatic Steatosis  - EtOH induced  - CT A/P revealed Steatosis with areas of more focal fatty infiltration  - Monitor    #VTE ppx  - Low risk    Dispo: DC to substance use rehab today, family will take patient directly there     Patient will update her family 49yo female with hx of chronic EtOH abuse, presented to the ED accompanied by her 2 sisters for detox    #ETOH withdrawal  - Anxiety improving. No diaphoresis, palpitations, nausea/vomiting  - CIWA score: 0  - no need for Librium taper and pt did not require any prn doses of IV ativan since yesterday morning  - Started Lexapro   - Pt accepted to substance rehab facility in [place]     #Thrombocytopenia-improved  - Likely related to chronic alcohol abuse  - Monitor CBC    #Electrolyte Abnormalities  - Replete as needed  - Monitor labs    #Dysuria  - UA reviewed  - Patient symptomatic, but improved  - Urine culture negative   - DC bactrim     #Transaminitis  #Hepatic Steatosis  - EtOH induced  - CT A/P revealed Steatosis with areas of more focal fatty infiltration  - Monitor    #VTE ppx  - Low risk    Dispo: DC to substance use rehab today, family will take patient directly there     Patient will update her family

## 2023-06-12 ENCOUNTER — NON-APPOINTMENT (OUTPATIENT)
Age: 48
End: 2023-06-12

## 2023-09-21 NOTE — DIETITIAN INITIAL EVALUATION ADULT - BODY MASS INDEX
Remote Alert Monitoring Note  Rpm alert to be reviewed by the provider   red alert   pulse ox reading (90%)   Additional needs to be addressed by PCP: No     Date/Time:  2023 9:42 AM  Patient Current Location: Home: [place]  RN  contacted patient by telephone. Verified patients name and  as identifiers. Background: Pt enrolled in RPM r/t HTN, COPD  Refer to 911 immediately if:  Patient unresponsive or unable to provide history  Change in cognition or sudden confusion  Patient unable to respond in complete sentences  Intense chest pain/tightness  Any concern for any clinical emergency  Red Alert: Provider response time of 1 hr required for any red alert requiring intervention  Yellow Alert: Provider response time of 3hr required for any escalated yellow alert  O2 Triage  Are you having any Chest Pain? no   Are you having any Shortness of Breath? No - baseline   Swelling in your hands or feet? no   Are you having any other health concerns or issues? no   Clinical Interventions: Reviewed and followed up on alerts and treatments-RN spoke to pt regarding red alert pulse ox 90%. States she \"feels fine\". Denies any new cough, congestion. Pt confirmed she has used Anoro inhaler this AM. Denies having to use rescue inhaler or nebulizer. Agreeable to recheck pulse ox - new metric 95%. No escalation needed at this time. Plan/Follow Up: Will continue to review, monitor and address alerts with follow up based on severity of symptoms and risk factors.
No
18.2

## 2024-01-04 ENCOUNTER — NON-APPOINTMENT (OUTPATIENT)
Age: 49
End: 2024-01-04

## 2024-01-06 PROBLEM — F10.10 ALCOHOL ABUSE, UNCOMPLICATED: Chronic | Status: ACTIVE | Noted: 2023-06-03

## 2024-01-08 ENCOUNTER — APPOINTMENT (OUTPATIENT)
Dept: ORTHOPEDIC SURGERY | Facility: CLINIC | Age: 49
End: 2024-01-08
Payer: COMMERCIAL

## 2024-01-08 ENCOUNTER — NON-APPOINTMENT (OUTPATIENT)
Age: 49
End: 2024-01-08

## 2024-01-08 VITALS — HEIGHT: 65.5 IN | WEIGHT: 110 LBS | BODY MASS INDEX: 18.11 KG/M2

## 2024-01-08 DIAGNOSIS — S99.922A UNSPECIFIED INJURY OF LEFT FOOT, INITIAL ENCOUNTER: ICD-10-CM

## 2024-01-08 DIAGNOSIS — S92.425A NONDISPLACED FRACTURE OF DISTAL PHALANX OF LEFT GREAT TOE, INITIAL ENCOUNTER FOR CLOSED FRACTURE: ICD-10-CM

## 2024-01-08 PROCEDURE — 99213 OFFICE O/P EST LOW 20 MIN: CPT

## 2024-01-08 PROCEDURE — 99203 OFFICE O/P NEW LOW 30 MIN: CPT

## 2024-01-08 NOTE — HISTORY OF PRESENT ILLNESS
[de-identified] : Patient presents with left great toe pain since injury sustained on 01/04/24. Patient states that while moving a dresser, a 5Lb dumbbell fell over left great toe. As a result she experienced pain, swelling and bruising in the left great toe. She reports she went to [facility] the day after the injury, she had x-rays done on 01/05/24, and was informed of left great toe fracture. She was placed in a postop sandal, which she has been wearing. She localizes the pain to medial aspect of left great toe. She has applied ice over the toe, which has helped. She denies currently taking any medication for this. She works as a teacher.

## 2024-01-08 NOTE — PHYSICAL EXAM
[de-identified] : Constitutional: Well-nourished, well-developed, No acute distress Respiratory:  Good respiratory effort, no SOB Lymphatic: No regional lymphadenopathy, no lymphedema Psychiatric: Pleasant and normal affect, alert and oriented x3 Musculoskeletal: normal except where as noted in regional exam   Left Foot: APPEARANCE: + swelling over first digit IP joint, no marked deformities or malalignment POSITIVE TENDERNESS: + TTP fisrt digit IP joint NONTENDER: 5th metatarsal base, cuboid, 1st MTP, dorsum & plantar surfaces, medial heel, mid heel.  ROM: Limited great toe flexion/extension due to stiffness/pain, otherwise normal throughout foot, ankle, and digits.  RESISTIVE TESTING: + mild pain with flex/ext, abd/add of all digits.   [de-identified] : [facility] EXAM: TOE LEFT  PROCEDURE DATE: 01/05/2024  INTERPRETATION: CLINICAL INDICATION: left hallux contusion status post fallen heavy object; pain and swelling  EXAM: Frontal oblique lateral left hallux from 1/5/2024 at 1754. No similar prior studies available for comparison.  IMPRESSION: Acute nondisplaced intra-articular avulsion fracture fragment at lateral hallux distal phalanx base. In addition, questionable nondisplaced hairline thin vertical fracture lucency in distal hallux proximal phalanx on frontal view versus projection artifact. No dislocations or additional suspected fractures.  Preserved joint spaces and no joint margin erosions. Bipartite medial hallux sesamoid. No discrete lytic or blastic lesions.  --- End of Report ---  ZEN FERNANDO MD; Attending Radiologist This document has been electronically signed. Jan 5 2024 6:00PM

## 2024-01-08 NOTE — DISCUSSION/SUMMARY
[de-identified] : Discussed findings of today's exam and possible causes of patient's pain.  Educated patient on their diagnosis of left foot nondisplaced distal phalanx avulsion fracture.  Reviewed possible courses of treatment, and we collaboratively decided best course of treatment at this time will include conservative management.  Patient will be immobilized in a short cam walker boot  for a total of 4 weeks since time of injury.  Advised patient this would be more comfortable than the postop shoe and more functional regarding her gait and mechanics.  Patient has altered gait she can utilize over-the-counter even up shoe lift to use as ambulatory assist.  Patient may continue take OTC medications as needed for pain relief.  Recommend follow-up in 4 weeks for repeat x-ray and reassessment.  Patient appreciates and agrees with current plan.    This note was generated using dragon medical dictation software.  A reasonable effort has been made for proofreading its contents, but typos may still remain.  If there are any questions or points of clarification needed please notify my office.

## 2024-07-23 NOTE — PATIENT PROFILE ADULT - FUNCTIONAL ASSESSMENT - BASIC MOBILITY 1.
Patient tolerated scan.  No nitroglycerin given to due issues with scanner.  Patient denies problems. IV taken out and discharged home with paperwork and belongings.   4 = No assist / stand by assistance

## 2024-09-10 ENCOUNTER — RESULT CHARGE (OUTPATIENT)
Age: 49
End: 2024-09-10

## 2024-09-11 ENCOUNTER — NON-APPOINTMENT (OUTPATIENT)
Age: 49
End: 2024-09-11

## 2024-09-11 ENCOUNTER — APPOINTMENT (OUTPATIENT)
Dept: FAMILY MEDICINE | Facility: CLINIC | Age: 49
End: 2024-09-11
Payer: COMMERCIAL

## 2024-09-11 ENCOUNTER — MED ADMIN CHARGE (OUTPATIENT)
Age: 49
End: 2024-09-11

## 2024-09-11 VITALS
OXYGEN SATURATION: 96 % | SYSTOLIC BLOOD PRESSURE: 98 MMHG | HEART RATE: 66 BPM | WEIGHT: 99 LBS | HEIGHT: 65.5 IN | DIASTOLIC BLOOD PRESSURE: 60 MMHG | BODY MASS INDEX: 16.3 KG/M2 | TEMPERATURE: 97.4 F

## 2024-09-11 DIAGNOSIS — F17.200 NICOTINE DEPENDENCE, UNSPECIFIED, UNCOMPLICATED: ICD-10-CM

## 2024-09-11 DIAGNOSIS — Z11.3 ENCOUNTER FOR SCREENING FOR INFECTIONS WITH A PREDOMINANTLY SEXUAL MODE OF TRANSMISSION: ICD-10-CM

## 2024-09-11 DIAGNOSIS — Z00.00 ENCOUNTER FOR GENERAL ADULT MEDICAL EXAMINATION W/OUT ABNORMAL FINDINGS: ICD-10-CM

## 2024-09-11 DIAGNOSIS — F10.10 ALCOHOL ABUSE, UNCOMPLICATED: ICD-10-CM

## 2024-09-11 DIAGNOSIS — F41.9 ANXIETY DISORDER, UNSPECIFIED: ICD-10-CM

## 2024-09-11 PROCEDURE — 90686 IIV4 VACC NO PRSV 0.5 ML IM: CPT

## 2024-09-11 PROCEDURE — 36415 COLL VENOUS BLD VENIPUNCTURE: CPT

## 2024-09-11 PROCEDURE — 99396 PREV VISIT EST AGE 40-64: CPT | Mod: 25

## 2024-09-11 PROCEDURE — 93000 ELECTROCARDIOGRAM COMPLETE: CPT

## 2024-09-11 PROCEDURE — 90472 IMMUNIZATION ADMIN EACH ADD: CPT

## 2024-09-11 PROCEDURE — G0008: CPT

## 2024-09-11 PROCEDURE — 90715 TDAP VACCINE 7 YRS/> IM: CPT

## 2024-09-11 RX ORDER — ESCITALOPRAM OXALATE 5 MG/1
5 TABLET ORAL
Refills: 0 | Status: ACTIVE | COMMUNITY

## 2024-09-11 RX ORDER — NALTREXONE HYDROCHLORIDE 50 MG/1
50 TABLET, FILM COATED ORAL
Refills: 0 | Status: ACTIVE | COMMUNITY

## 2024-09-11 RX ORDER — ESCITALOPRAM OXALATE 10 MG/1
10 TABLET ORAL
Refills: 0 | Status: ACTIVE | COMMUNITY

## 2024-09-11 NOTE — HISTORY OF PRESENT ILLNESS
[FreeTextEntry1] : CPE. [de-identified] : Patient is a 48yo female with PMH anxiety who presents to the office for CPE.    Last CPE:  08/15/2022, Dr. Robertson.  GYN Exam:  due, recommended today -- pt planning on scheduling in near future; last 2021, Dr. Donato.  Mammogram:  11/2020, BIRADS-2 (receives breast US as well).  Will give RX. Colonoscopy:  due, recommended today; no known family history of colon cancer.  CT Lung Cancer Screen:   due, will give RX. Ophthalmology:  due, recommended today; wears readers. Dermatology:  has appointment scheduled for Saturday; Dr. Tovar. Dentist:  due, recommended today. Shingles:  due, recommended today; will schedule in future. Flu:  does not typically get the flu shot but agreeable this year. Tdap:  03/01/2013, recommended today.  COVID:  UTD. LMP:  perimenopausal; spotted recently, spotted x2 this year. STOP BANG negative.

## 2024-09-11 NOTE — ASSESSMENT
[FreeTextEntry1] : Patient is a 48yo female with PMH anxiety, alcohol abuse, tobacco dependence who presents to the office for CPE.  Health Maintenance - Due for mammogram/breast US, order placed. - Due for CT lung cancer screen, order placed. - Due for CRC screening, GI referral provided. - Due for GYN exam, recommended today. - Due for eye exam, recommended today. - Flu shot given today. - Fasting labs drawn in office. - Pt agreeable to receiving results through [facility] messaging.  Pt advised if they do not hear from the office within the next week, or if they have difficulty opening their [facility] message, they should call the office to review results. - EKG performed in office SB, no acute ST/T changes, no arrhythmias.  Pt denies cardiac complaints. - Eat plenty of fruits and vegetables, especially deeply colored fruits/vegetables (such as leafy greens, peaches) that are more nutrient-dense.  Continue to work hard on diet and exercise, limiting/avoiding saturated fat, fatty foods, greasy foods, red meats, white flour-based carbohydrates (cookies, cakes, white bread, white rice), and added sugars.  Chose whole grain foods and products made with whole grains over refined grains and white flour-based carbohydrates.  Avoid beverages and food with added sugar.  Limit salt intake to improve blood pressure.  Limit alcohol intake. - Try and incorporate 30 minutes of aerobic exercise to your daily routine.  Pt will continue regular f/u with therapist/psychiatrist for anxiety, substance abuse disorder.  Pt requests b/w be sent to her therapist, Camryn Teague, at [email or web address]  Pt requesting STD labs today.  Pt asymptomatic.  Call the office or go to the ED immediately if you develop new, worsening or concerning symptoms including high fever, severe headache/worst headache of your life, confusion, dizziness/lightheadedness, loss of consciousness, severe chest pain, difficulty breathing, shortness of breath, severe abdominal pain, excessive vomiting/diarrhea, inability to feel/move the extremities, or any other concerning symptoms.

## 2024-09-11 NOTE — HEALTH RISK ASSESSMENT
[No] : In the past 12 months have you used drugs other than those required for medical reasons? No [No falls in past year] : Patient reported no falls in the past year [0] : 2) Feeling down, depressed, or hopeless: Not at all (0) [PHQ-2 Negative - No further assessment needed] : PHQ-2 Negative - No further assessment needed [Current] : Current [20 or more] : 20 or more [HIV Test offered] : HIV Test offered [Hepatitis C test offered] : Hepatitis C test offered [None] : None [Alone] : lives alone [Employed] : employed [] :  [# Of Children ___] : has [unfilled] children [Sexually Active] : sexually active [Feels Safe at Home] : Feels safe at home [Fully functional (bathing, dressing, toileting, transferring, walking, feeding)] : Fully functional (bathing, dressing, toileting, transferring, walking, feeding) [Fully functional (using the telephone, shopping, preparing meals, housekeeping, doing laundry, using] : Fully functional and needs no help or supervision to perform IADLs (using the telephone, shopping, preparing meals, housekeeping, doing laundry, using transportation, managing medications and managing finances) [With Patient/Caregiver] : , with patient/caregiver [Reviewed no changes] : Reviewed, no changes [I will adhere to the patient's wishes.] : I will adhere to the patient's wishes. [Patient reported mammogram was abnormal] : Patient reported mammogram was abnormal [Patient reported PAP Smear was normal] : Patient reported PAP Smear was normal [de-identified] : sober [Audit-CScore] : 0 [de-identified] : has RX for marijuana [de-identified] : limited exercise [de-identified] : room for improvement; eats a lot of takeout [de-identified] : follows with a therapist regularly [[id number]] : 0 [de-identified] : started smoking again over the summer, had quit ~20 years ago prior [Change in mental status noted] : No change in mental status noted [Language] : denies difficulty with language [High Risk Behavior] : no high risk behavior [Reports changes in hearing] : Reports no changes in hearing [Reports changes in vision] : Reports no changes in vision [Reports changes in dental health] : Reports no changes in dental health [MammogramComments] : had f/u scans which were normal but has not returned for rpt imaging. [MammogramDate] : 11/2020 [PapSmearDate] : 2021 [de-identified] : lives alone currently but will be moving back into her house with her children [FreeTextEntry2] :

## 2024-09-12 ENCOUNTER — TRANSCRIPTION ENCOUNTER (OUTPATIENT)
Age: 49
End: 2024-09-12

## 2024-09-12 DIAGNOSIS — E78.5 HYPERLIPIDEMIA, UNSPECIFIED: ICD-10-CM

## 2024-09-12 DIAGNOSIS — D72.829 ELEVATED WHITE BLOOD CELL COUNT, UNSPECIFIED: ICD-10-CM

## 2024-09-12 LAB
25(OH)D3 SERPL-MCNC: 36.1 NG/ML
ALBUMIN SERPL ELPH-MCNC: 4.6 G/DL
ALP BLD-CCNC: 69 U/L
ALT SERPL-CCNC: 11 U/L
ANION GAP SERPL CALC-SCNC: 12 MMOL/L
AST SERPL-CCNC: 14 U/L
BASOPHILS # BLD AUTO: 0.07 K/UL
BASOPHILS NFR BLD AUTO: 0.6 %
BILIRUB SERPL-MCNC: 0.2 MG/DL
BUN SERPL-MCNC: 15 MG/DL
CALCIUM SERPL-MCNC: 9.2 MG/DL
CHLORIDE SERPL-SCNC: 98 MMOL/L
CHOLEST SERPL-MCNC: 245 MG/DL
CO2 SERPL-SCNC: 24 MMOL/L
CREAT SERPL-MCNC: 0.89 MG/DL
EGFR: 79 ML/MIN/1.73M2
EOSINOPHIL # BLD AUTO: 0.06 K/UL
EOSINOPHIL NFR BLD AUTO: 0.5 %
GLUCOSE SERPL-MCNC: 82 MG/DL
HCT VFR BLD CALC: 43.3 %
HDLC SERPL-MCNC: 58 MG/DL
HGB BLD-MCNC: 13.2 G/DL
IMM GRANULOCYTES NFR BLD AUTO: 0.3 %
LDLC SERPL CALC-MCNC: 174 MG/DL
LYMPHOCYTES # BLD AUTO: 2.57 K/UL
LYMPHOCYTES NFR BLD AUTO: 22.6 %
MAN DIFF?: NORMAL
MCHC RBC-ENTMCNC: 29.7 PG
MCHC RBC-ENTMCNC: 30.5 GM/DL
MCV RBC AUTO: 97.5 FL
MONOCYTES # BLD AUTO: 0.52 K/UL
MONOCYTES NFR BLD AUTO: 4.6 %
NEUTROPHILS # BLD AUTO: 8.11 K/UL
NEUTROPHILS NFR BLD AUTO: 71.4 %
NONHDLC SERPL-MCNC: 187 MG/DL
PLATELET # BLD AUTO: 438 K/UL
POTASSIUM SERPL-SCNC: 4.1 MMOL/L
PROT SERPL-MCNC: 7 G/DL
RBC # BLD: 4.44 M/UL
RBC # FLD: 14.3 %
SODIUM SERPL-SCNC: 134 MMOL/L
TRIGL SERPL-MCNC: 76 MG/DL
TSH SERPL-ACNC: 1.23 UIU/ML
WBC # FLD AUTO: 11.36 K/UL

## 2024-09-13 ENCOUNTER — TRANSCRIPTION ENCOUNTER (OUTPATIENT)
Age: 49
End: 2024-09-13

## 2024-09-13 DIAGNOSIS — Z78.9 OTHER SPECIFIED HEALTH STATUS: ICD-10-CM

## 2024-09-13 LAB
HBV SURFACE AB SERPL IA-ACNC: <3 MIU/ML
HBV SURFACE AG SER QL: NONREACTIVE
HCV AB SER QL: NONREACTIVE
HCV S/CO RATIO: 0.14 S/CO
HIV1+2 AB SPEC QL IA.RAPID: NONREACTIVE

## 2024-09-14 ENCOUNTER — TRANSCRIPTION ENCOUNTER (OUTPATIENT)
Age: 49
End: 2024-09-14

## 2024-09-14 LAB
HSV 1+2 IGG SER IA-IMP: NEGATIVE
HSV 1+2 IGG SER IA-IMP: POSITIVE
HSV1 IGG SER QL: 55.6 INDEX
HSV2 IGG SER QL: 0.56 INDEX
T PALLIDUM AB SER QL IA: NEGATIVE

## 2024-09-16 ENCOUNTER — TRANSCRIPTION ENCOUNTER (OUTPATIENT)
Age: 49
End: 2024-09-16

## 2024-09-16 DIAGNOSIS — B00.1 HERPESVIRAL VESICULAR DERMATITIS: ICD-10-CM

## 2024-09-16 RX ORDER — VALACYCLOVIR 1 G/1
1 TABLET, FILM COATED ORAL
Qty: 30 | Refills: 2 | Status: ACTIVE | COMMUNITY
Start: 2024-09-16 | End: 1900-01-01

## 2025-03-25 NOTE — PATIENT PROFILE ADULT - SURGICAL SITE INCISION
no
Other (Free Text): Excel 2
Render Risk Assessment In Note?: no
Detail Level: Detailed
Note Text (......Xxx Chief Complaint.): This diagnosis correlates with the

## 2025-03-27 NOTE — DIETITIAN INITIAL EVALUATION ADULT - NUTRITION CONSULT
Large Joint Aspiration/Injection: bilateral knee    Date/Time: 3/27/2025 2:30 PM    Performed by: Bayron Lea MD  Authorized by: Bayron Lea MD    Consent Done?:  Yes (Verbal)  Timeout: prior to procedure the correct patient, procedure, and site was verified    Prep: patient was prepped and draped in usual sterile fashion      Details:  Needle Size:  21 G  Approach:  Anterolateral  Location:  Knee  Laterality:  Bilateral  Site:  Bilateral knee  Medications (Right):  40 mg triamcinolone acetonide 40 mg/mL  Medications (Left):  40 mg triamcinolone acetonide 40 mg/mL  Patient tolerance:  Patient tolerated the procedure well with no immediate complications     no

## 2025-05-02 NOTE — DISCHARGE NOTE NURSING/CASE MANAGEMENT/SOCIAL WORK - NSDCPEFALRISK_GEN_ALL_CORE
Occupational Therapy    Patient not seen in therapy.     Unavailable due to feeling too ill, sleeping soundly, medical tests/procedures and request no therapy today.      On hold due to nursing request    Discontinue therapy: patient status post surgical/invasive medical procedure, will need new orders to resume    Re-attempt plan: tomorrow    Patient on HOLD per RN. Having Surgery in afternoon.  Visit Type: initial evaluation    OBJECTIVE                             Therapy procedure time and total treatment time can be found documented on the Time Entry flowsheet   For information on Fall & Injury Prevention, visit: [email or web address] OR  [email or web address] OR  https://www.cdc.gov/steadi/patient.html